# Patient Record
Sex: MALE | Race: WHITE | Employment: UNEMPLOYED | URBAN - METROPOLITAN AREA
[De-identification: names, ages, dates, MRNs, and addresses within clinical notes are randomized per-mention and may not be internally consistent; named-entity substitution may affect disease eponyms.]

---

## 2019-04-25 ENCOUNTER — HOSPITAL ENCOUNTER (EMERGENCY)
Age: 39
Discharge: HOME | End: 2019-04-25
Attending: EMERGENCY MEDICINE
Payer: MEDICAID

## 2019-04-25 VITALS
RESPIRATION RATE: 18 BRPM | OXYGEN SATURATION: 99 % | DIASTOLIC BLOOD PRESSURE: 83 MMHG | HEART RATE: 71 BPM | TEMPERATURE: 98.2 F | SYSTOLIC BLOOD PRESSURE: 123 MMHG

## 2019-04-25 DIAGNOSIS — S00.431A HEMATOMA OF RIGHT PINNA, INITIAL ENCOUNTER: Primary | ICD-10-CM

## 2019-04-25 PROCEDURE — 4500000022 HC ED LEVEL 2 PROCEDURE

## 2019-04-25 PROCEDURE — 2500000003 HC RX 250 WO HCPCS: Performed by: STUDENT IN AN ORGANIZED HEALTH CARE EDUCATION/TRAINING PROGRAM

## 2019-04-25 PROCEDURE — 99282 EMERGENCY DEPT VISIT SF MDM: CPT

## 2019-04-25 RX ORDER — CEPHALEXIN 500 MG/1
500 CAPSULE ORAL 4 TIMES DAILY
Qty: 28 CAPSULE | Refills: 0 | Status: SHIPPED | OUTPATIENT
Start: 2019-04-25 | End: 2019-05-02

## 2019-04-25 RX ORDER — LIDOCAINE HYDROCHLORIDE AND EPINEPHRINE 10; 10 MG/ML; UG/ML
20 INJECTION, SOLUTION INFILTRATION; PERINEURAL ONCE
Status: COMPLETED | OUTPATIENT
Start: 2019-04-25 | End: 2019-04-25

## 2019-04-25 RX ADMIN — LIDOCAINE HYDROCHLORIDE,EPINEPHRINE BITARTRATE 20 ML: 10; .01 INJECTION, SOLUTION INFILTRATION; PERINEURAL at 14:29

## 2019-04-25 ASSESSMENT — PAIN DESCRIPTION - ORIENTATION: ORIENTATION: RIGHT

## 2019-04-25 ASSESSMENT — PAIN DESCRIPTION - LOCATION: LOCATION: EAR

## 2019-04-25 ASSESSMENT — PAIN DESCRIPTION - DESCRIPTORS: DESCRIPTORS: STABBING

## 2019-04-25 ASSESSMENT — PAIN DESCRIPTION - PROGRESSION: CLINICAL_PROGRESSION: GRADUALLY WORSENING

## 2019-04-25 ASSESSMENT — PAIN DESCRIPTION - FREQUENCY: FREQUENCY: CONTINUOUS

## 2019-04-25 ASSESSMENT — PAIN SCALES - GENERAL
PAINLEVEL_OUTOF10: 8
PAINLEVEL_OUTOF10: 8
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
## 2019-04-25 NOTE — ED NOTES
Pt resting in bed with eyes closed. Pt wakes up to verbal stimuli. Call light in reach will continue to monitor.       Shaniqua Lord RN  04/25/19 0188
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
## 2019-05-08 ENCOUNTER — OFFICE VISIT (OUTPATIENT)
Dept: ENT CLINIC | Age: 39
End: 2019-05-08
Payer: MEDICAID

## 2019-05-08 VITALS
DIASTOLIC BLOOD PRESSURE: 87 MMHG | HEART RATE: 84 BPM | SYSTOLIC BLOOD PRESSURE: 138 MMHG | WEIGHT: 165 LBS | BODY MASS INDEX: 23.1 KG/M2 | HEIGHT: 71 IN | TEMPERATURE: 97.7 F

## 2019-05-08 DIAGNOSIS — H93.11 TINNITUS, RIGHT: ICD-10-CM

## 2019-05-08 DIAGNOSIS — S00.431S: Primary | ICD-10-CM

## 2019-05-08 PROCEDURE — 99203 OFFICE O/P NEW LOW 30 MIN: CPT | Performed by: OTOLARYNGOLOGY

## 2019-05-08 PROCEDURE — G8420 CALC BMI NORM PARAMETERS: HCPCS | Performed by: OTOLARYNGOLOGY

## 2019-05-08 PROCEDURE — 4004F PT TOBACCO SCREEN RCVD TLK: CPT | Performed by: OTOLARYNGOLOGY

## 2019-05-08 PROCEDURE — G8427 DOCREV CUR MEDS BY ELIG CLIN: HCPCS | Performed by: OTOLARYNGOLOGY

## 2019-05-08 ASSESSMENT — ENCOUNTER SYMPTOMS
VOMITING: 0
FACIAL SWELLING: 0
BACK PAIN: 0
SINUS PAIN: 0
COLOR CHANGE: 0
SORE THROAT: 0
SINUS PRESSURE: 0
EYE DISCHARGE: 0
COUGH: 0
CHOKING: 0
PHOTOPHOBIA: 0
EYE ITCHING: 0
WHEEZING: 0
RHINORRHEA: 0
BLOOD IN STOOL: 0
STRIDOR: 0
CONSTIPATION: 0
NAUSEA: 0
DIARRHEA: 0
VOICE CHANGE: 0
TROUBLE SWALLOWING: 0
SHORTNESS OF BREATH: 0
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
## 2019-07-30 ENCOUNTER — HOSPITAL ENCOUNTER (EMERGENCY)
Age: 39
Discharge: HOME | End: 2019-07-30
Attending: EMERGENCY MEDICINE
Payer: MEDICAID

## 2019-07-30 ENCOUNTER — APPOINTMENT (OUTPATIENT)
Dept: GENERAL RADIOLOGY | Age: 39
End: 2019-07-30
Payer: MEDICAID

## 2019-07-30 VITALS
HEART RATE: 84 BPM | TEMPERATURE: 98 F | DIASTOLIC BLOOD PRESSURE: 83 MMHG | WEIGHT: 175 LBS | BODY MASS INDEX: 24.5 KG/M2 | SYSTOLIC BLOOD PRESSURE: 113 MMHG | RESPIRATION RATE: 16 BRPM | OXYGEN SATURATION: 94 % | HEIGHT: 71 IN

## 2019-07-30 DIAGNOSIS — S90.31XA CONTUSION OF RIGHT FOOT, INITIAL ENCOUNTER: Primary | ICD-10-CM

## 2019-07-30 PROCEDURE — 73630 X-RAY EXAM OF FOOT: CPT

## 2019-07-30 PROCEDURE — 99283 EMERGENCY DEPT VISIT LOW MDM: CPT

## 2019-07-30 ASSESSMENT — ENCOUNTER SYMPTOMS
SHORTNESS OF BREATH: 0
COUGH: 0
WHEEZING: 0
ABDOMINAL PAIN: 0
NAUSEA: 0
VOMITING: 0
EYE PAIN: 0
DIARRHEA: 0

## 2020-03-05 ENCOUNTER — HOSPITAL ENCOUNTER (INPATIENT)
Age: 40
LOS: 1 days | Discharge: HOME | DRG: 720 | End: 2020-03-07
Attending: EMERGENCY MEDICINE | Admitting: INTERNAL MEDICINE
Payer: COMMERCIAL

## 2020-03-05 PROCEDURE — 85025 COMPLETE CBC W/AUTO DIFF WBC: CPT

## 2020-03-05 PROCEDURE — 96365 THER/PROPH/DIAG IV INF INIT: CPT

## 2020-03-05 PROCEDURE — 36415 COLL VENOUS BLD VENIPUNCTURE: CPT

## 2020-03-05 PROCEDURE — 2580000003 HC RX 258: Performed by: PHYSICIAN ASSISTANT

## 2020-03-05 PROCEDURE — 83605 ASSAY OF LACTIC ACID: CPT

## 2020-03-05 PROCEDURE — 80076 HEPATIC FUNCTION PANEL: CPT

## 2020-03-05 PROCEDURE — 99284 EMERGENCY DEPT VISIT MOD MDM: CPT

## 2020-03-05 PROCEDURE — 96375 TX/PRO/DX INJ NEW DRUG ADDON: CPT

## 2020-03-05 PROCEDURE — 6360000002 HC RX W HCPCS: Performed by: PHYSICIAN ASSISTANT

## 2020-03-05 PROCEDURE — 87040 BLOOD CULTURE FOR BACTERIA: CPT

## 2020-03-05 PROCEDURE — 80048 BASIC METABOLIC PNL TOTAL CA: CPT

## 2020-03-05 PROCEDURE — 86140 C-REACTIVE PROTEIN: CPT

## 2020-03-05 PROCEDURE — 83735 ASSAY OF MAGNESIUM: CPT

## 2020-03-05 RX ORDER — SODIUM CHLORIDE, SODIUM LACTATE, POTASSIUM CHLORIDE, AND CALCIUM CHLORIDE .6; .31; .03; .02 G/100ML; G/100ML; G/100ML; G/100ML
30 INJECTION, SOLUTION INTRAVENOUS ONCE
Status: COMPLETED | OUTPATIENT
Start: 2020-03-05 | End: 2020-03-06

## 2020-03-05 RX ORDER — MORPHINE SULFATE 4 MG/ML
4 INJECTION, SOLUTION INTRAMUSCULAR; INTRAVENOUS
Status: DISCONTINUED | OUTPATIENT
Start: 2020-03-05 | End: 2020-03-06

## 2020-03-05 RX ADMIN — SODIUM CHLORIDE, POTASSIUM CHLORIDE, SODIUM LACTATE AND CALCIUM CHLORIDE 2478 ML: 600; 310; 30; 20 INJECTION, SOLUTION INTRAVENOUS at 23:56

## 2020-03-05 RX ADMIN — MORPHINE SULFATE 4 MG: 4 INJECTION INTRAVENOUS at 23:56

## 2020-03-05 ASSESSMENT — PAIN SCALES - GENERAL
PAINLEVEL_OUTOF10: 10
PAINLEVEL_OUTOF10: 10

## 2020-03-06 ENCOUNTER — APPOINTMENT (OUTPATIENT)
Dept: CT IMAGING | Age: 40
DRG: 720 | End: 2020-03-06
Payer: COMMERCIAL

## 2020-03-06 PROBLEM — A41.9 SEPSIS (HCC): Status: ACTIVE | Noted: 2020-03-06

## 2020-03-06 LAB
ABO/RH: NORMAL
ALBUMIN SERPL-MCNC: 3.4 G/DL (ref 3.4–5)
ALBUMIN SERPL-MCNC: 3.7 G/DL (ref 3.4–5)
ALP BLD-CCNC: 104 U/L (ref 40–129)
ALT SERPL-CCNC: 72 U/L (ref 10–40)
AMPHETAMINE SCREEN, URINE: ABNORMAL
ANION GAP SERPL CALCULATED.3IONS-SCNC: 11 MMOL/L (ref 3–16)
ANION GAP SERPL CALCULATED.3IONS-SCNC: 12 MMOL/L (ref 3–16)
ANTIBODY IDENTIFICATION: NORMAL
ANTIBODY SCREEN: NORMAL
AST SERPL-CCNC: 21 U/L (ref 15–37)
BANDED NEUTROPHILS RELATIVE PERCENT: 1 % (ref 0–7)
BARBITURATE SCREEN URINE: ABNORMAL
BASOPHILS ABSOLUTE: 0 K/UL (ref 0–0.2)
BASOPHILS ABSOLUTE: 0.2 K/UL (ref 0–0.2)
BASOPHILS RELATIVE PERCENT: 0 %
BASOPHILS RELATIVE PERCENT: 1.2 %
BENZODIAZEPINE SCREEN, URINE: ABNORMAL
BILIRUB SERPL-MCNC: 0.7 MG/DL (ref 0–1)
BILIRUBIN DIRECT: <0.2 MG/DL (ref 0–0.3)
BILIRUBIN, INDIRECT: ABNORMAL MG/DL (ref 0–1)
BUN BLDV-MCNC: 6 MG/DL (ref 7–20)
BUN BLDV-MCNC: 7 MG/DL (ref 7–20)
C-REACTIVE PROTEIN: 25.3 MG/L (ref 0–5.1)
CALCIUM SERPL-MCNC: 8.2 MG/DL (ref 8.3–10.6)
CALCIUM SERPL-MCNC: 9.1 MG/DL (ref 8.3–10.6)
CANNABINOID SCREEN URINE: ABNORMAL
CHLORIDE BLD-SCNC: 89 MMOL/L (ref 99–110)
CHLORIDE BLD-SCNC: 92 MMOL/L (ref 99–110)
CO2: 26 MMOL/L (ref 21–32)
CO2: 27 MMOL/L (ref 21–32)
COCAINE METABOLITE SCREEN URINE: POSITIVE
CREAT SERPL-MCNC: 0.6 MG/DL (ref 0.9–1.3)
CREAT SERPL-MCNC: <0.5 MG/DL (ref 0.9–1.3)
EOSINOPHILS ABSOLUTE: 0 K/UL (ref 0–0.6)
EOSINOPHILS ABSOLUTE: 0 K/UL (ref 0–0.6)
EOSINOPHILS RELATIVE PERCENT: 0 %
EOSINOPHILS RELATIVE PERCENT: 0.3 %
GFR AFRICAN AMERICAN: >60
GFR AFRICAN AMERICAN: >60
GFR NON-AFRICAN AMERICAN: >60
GFR NON-AFRICAN AMERICAN: >60
GLUCOSE BLD-MCNC: 198 MG/DL (ref 70–99)
GLUCOSE BLD-MCNC: 238 MG/DL (ref 70–99)
GLUCOSE BLD-MCNC: 282 MG/DL (ref 70–99)
GLUCOSE BLD-MCNC: 308 MG/DL (ref 70–99)
GLUCOSE BLD-MCNC: 326 MG/DL (ref 70–99)
GLUCOSE BLD-MCNC: 445 MG/DL (ref 70–99)
HCT VFR BLD CALC: 40.5 % (ref 40.5–52.5)
HCT VFR BLD CALC: 41.5 % (ref 40.5–52.5)
HEMOGLOBIN: 14.3 G/DL (ref 13.5–17.5)
HEMOGLOBIN: 14.6 G/DL (ref 13.5–17.5)
INR BLD: 1.28 (ref 0.86–1.14)
LACTIC ACID: 1.2 MMOL/L (ref 0.4–2)
LACTIC ACID: 1.2 MMOL/L (ref 0.4–2)
LACTIC ACID: 2.8 MMOL/L (ref 0.4–2)
LYMPHOCYTES ABSOLUTE: 1.7 K/UL (ref 1–5.1)
LYMPHOCYTES ABSOLUTE: 2.1 K/UL (ref 1–5.1)
LYMPHOCYTES RELATIVE PERCENT: 12 %
LYMPHOCYTES RELATIVE PERCENT: 15.1 %
Lab: ABNORMAL
MAGNESIUM: 1.4 MG/DL (ref 1.8–2.4)
MAGNESIUM: 1.6 MG/DL (ref 1.8–2.4)
MCH RBC QN AUTO: 31.9 PG (ref 26–34)
MCH RBC QN AUTO: 31.9 PG (ref 26–34)
MCHC RBC AUTO-ENTMCNC: 35.1 G/DL (ref 31–36)
MCHC RBC AUTO-ENTMCNC: 35.3 G/DL (ref 31–36)
MCV RBC AUTO: 90.5 FL (ref 80–100)
MCV RBC AUTO: 91 FL (ref 80–100)
METAMYELOCYTES RELATIVE PERCENT: 2 %
METHADONE SCREEN, URINE: ABNORMAL
MONOCYTES ABSOLUTE: 0.7 K/UL (ref 0–1.3)
MONOCYTES ABSOLUTE: 1.2 K/UL (ref 0–1.3)
MONOCYTES RELATIVE PERCENT: 5 %
MONOCYTES RELATIVE PERCENT: 8.7 %
NEUTROPHILS ABSOLUTE: 10.2 K/UL (ref 1.7–7.7)
NEUTROPHILS ABSOLUTE: 11.5 K/UL (ref 1.7–7.7)
NEUTROPHILS RELATIVE PERCENT: 74.7 %
NEUTROPHILS RELATIVE PERCENT: 80 %
OPIATE SCREEN URINE: POSITIVE
OXYCODONE URINE: ABNORMAL
PDW BLD-RTO: 13 % (ref 12.4–15.4)
PDW BLD-RTO: 13.2 % (ref 12.4–15.4)
PERFORMED ON: ABNORMAL
PH UA: 6
PHENCYCLIDINE SCREEN URINE: ABNORMAL
PHOSPHORUS: 3.3 MG/DL (ref 2.5–4.9)
PLATELET # BLD: 181 K/UL (ref 135–450)
PLATELET # BLD: 207 K/UL (ref 135–450)
PMV BLD AUTO: 8.3 FL (ref 5–10.5)
PMV BLD AUTO: 8.7 FL (ref 5–10.5)
POTASSIUM REFLEX MAGNESIUM: 3.5 MMOL/L (ref 3.5–5.1)
POTASSIUM SERPL-SCNC: 3.5 MMOL/L (ref 3.5–5.1)
PROPOXYPHENE SCREEN: ABNORMAL
PROTHROMBIN TIME: 14.9 SEC (ref 10–13.2)
RBC # BLD: 4.47 M/UL (ref 4.2–5.9)
RBC # BLD: 4.56 M/UL (ref 4.2–5.9)
SODIUM BLD-SCNC: 127 MMOL/L (ref 136–145)
SODIUM BLD-SCNC: 130 MMOL/L (ref 136–145)
TOTAL PROTEIN: 7.5 G/DL (ref 6.4–8.2)
WBC # BLD: 13.6 K/UL (ref 4–11)
WBC # BLD: 13.9 K/UL (ref 4–11)

## 2020-03-06 PROCEDURE — 86922 COMPATIBILITY TEST ANTIGLOB: CPT

## 2020-03-06 PROCEDURE — 6360000002 HC RX W HCPCS: Performed by: INTERNAL MEDICINE

## 2020-03-06 PROCEDURE — 87040 BLOOD CULTURE FOR BACTERIA: CPT

## 2020-03-06 PROCEDURE — 6370000000 HC RX 637 (ALT 250 FOR IP): Performed by: INTERNAL MEDICINE

## 2020-03-06 PROCEDURE — 6370000000 HC RX 637 (ALT 250 FOR IP): Performed by: PHYSICIAN ASSISTANT

## 2020-03-06 PROCEDURE — 87075 CULTR BACTERIA EXCEPT BLOOD: CPT

## 2020-03-06 PROCEDURE — 85025 COMPLETE CBC W/AUTO DIFF WBC: CPT

## 2020-03-06 PROCEDURE — 2500000003 HC RX 250 WO HCPCS: Performed by: INTERNAL MEDICINE

## 2020-03-06 PROCEDURE — 2580000003 HC RX 258: Performed by: PHYSICIAN ASSISTANT

## 2020-03-06 PROCEDURE — 0JBH0ZZ EXCISION OF LEFT LOWER ARM SUBCUTANEOUS TISSUE AND FASCIA, OPEN APPROACH: ICD-10-PCS | Performed by: ORTHOPAEDIC SURGERY

## 2020-03-06 PROCEDURE — 86900 BLOOD TYPING SEROLOGIC ABO: CPT

## 2020-03-06 PROCEDURE — 96372 THER/PROPH/DIAG INJ SC/IM: CPT

## 2020-03-06 PROCEDURE — 6360000002 HC RX W HCPCS: Performed by: SURGERY

## 2020-03-06 PROCEDURE — 83605 ASSAY OF LACTIC ACID: CPT

## 2020-03-06 PROCEDURE — 83735 ASSAY OF MAGNESIUM: CPT

## 2020-03-06 PROCEDURE — 99219 PR INITIAL OBSERVATION CARE/DAY 50 MINUTES: CPT | Performed by: SURGERY

## 2020-03-06 PROCEDURE — 86870 RBC ANTIBODY IDENTIFICATION: CPT

## 2020-03-06 PROCEDURE — 6360000004 HC RX CONTRAST MEDICATION: Performed by: EMERGENCY MEDICINE

## 2020-03-06 PROCEDURE — 96367 TX/PROPH/DG ADDL SEQ IV INF: CPT

## 2020-03-06 PROCEDURE — 2500000003 HC RX 250 WO HCPCS: Performed by: ORTHOPAEDIC SURGERY

## 2020-03-06 PROCEDURE — 96366 THER/PROPH/DIAG IV INF ADDON: CPT

## 2020-03-06 PROCEDURE — 2500000003 HC RX 250 WO HCPCS: Performed by: PHYSICIAN ASSISTANT

## 2020-03-06 PROCEDURE — 6360000002 HC RX W HCPCS: Performed by: PHYSICIAN ASSISTANT

## 2020-03-06 PROCEDURE — 87205 SMEAR GRAM STAIN: CPT

## 2020-03-06 PROCEDURE — 83036 HEMOGLOBIN GLYCOSYLATED A1C: CPT

## 2020-03-06 PROCEDURE — 87070 CULTURE OTHR SPECIMN AEROBIC: CPT

## 2020-03-06 PROCEDURE — 86850 RBC ANTIBODY SCREEN: CPT

## 2020-03-06 PROCEDURE — 87077 CULTURE AEROBIC IDENTIFY: CPT

## 2020-03-06 PROCEDURE — 73201 CT UPPER EXTREMITY W/DYE: CPT

## 2020-03-06 PROCEDURE — 85610 PROTHROMBIN TIME: CPT

## 2020-03-06 PROCEDURE — 96368 THER/DIAG CONCURRENT INF: CPT

## 2020-03-06 PROCEDURE — 86901 BLOOD TYPING SEROLOGIC RH(D): CPT

## 2020-03-06 PROCEDURE — G0378 HOSPITAL OBSERVATION PER HR: HCPCS

## 2020-03-06 PROCEDURE — 1200000000 HC SEMI PRIVATE

## 2020-03-06 PROCEDURE — 36415 COLL VENOUS BLD VENIPUNCTURE: CPT

## 2020-03-06 PROCEDURE — 2580000003 HC RX 258: Performed by: INTERNAL MEDICINE

## 2020-03-06 PROCEDURE — 80307 DRUG TEST PRSMV CHEM ANLYZR: CPT

## 2020-03-06 PROCEDURE — 80069 RENAL FUNCTION PANEL: CPT

## 2020-03-06 RX ORDER — ACETAMINOPHEN 325 MG/1
650 TABLET ORAL ONCE
Status: COMPLETED | OUTPATIENT
Start: 2020-03-06 | End: 2020-03-06

## 2020-03-06 RX ORDER — PROMETHAZINE HYDROCHLORIDE 25 MG/1
12.5 TABLET ORAL EVERY 6 HOURS PRN
Status: DISCONTINUED | OUTPATIENT
Start: 2020-03-06 | End: 2020-03-07 | Stop reason: HOSPADM

## 2020-03-06 RX ORDER — MAGNESIUM SULFATE IN WATER 40 MG/ML
4 INJECTION, SOLUTION INTRAVENOUS ONCE
Status: DISCONTINUED | OUTPATIENT
Start: 2020-03-06 | End: 2020-03-06

## 2020-03-06 RX ORDER — NICOTINE POLACRILEX 4 MG
15 LOZENGE BUCCAL PRN
Status: DISCONTINUED | OUTPATIENT
Start: 2020-03-06 | End: 2020-03-07 | Stop reason: HOSPADM

## 2020-03-06 RX ORDER — ONDANSETRON 2 MG/ML
4 INJECTION INTRAMUSCULAR; INTRAVENOUS EVERY 6 HOURS PRN
Status: DISCONTINUED | OUTPATIENT
Start: 2020-03-06 | End: 2020-03-07 | Stop reason: HOSPADM

## 2020-03-06 RX ORDER — INSULIN LISPRO 100 [IU]/ML
0-12 INJECTION, SOLUTION INTRAVENOUS; SUBCUTANEOUS
Status: DISCONTINUED | OUTPATIENT
Start: 2020-03-06 | End: 2020-03-07

## 2020-03-06 RX ORDER — SODIUM CHLORIDE 9 MG/ML
INJECTION, SOLUTION INTRAVENOUS CONTINUOUS
Status: ACTIVE | OUTPATIENT
Start: 2020-03-06 | End: 2020-03-07

## 2020-03-06 RX ORDER — POTASSIUM CHLORIDE 20 MEQ/1
40 TABLET, EXTENDED RELEASE ORAL PRN
Status: DISCONTINUED | OUTPATIENT
Start: 2020-03-06 | End: 2020-03-07 | Stop reason: HOSPADM

## 2020-03-06 RX ORDER — INSULIN LISPRO 100 [IU]/ML
0-6 INJECTION, SOLUTION INTRAVENOUS; SUBCUTANEOUS NIGHTLY
Status: DISCONTINUED | OUTPATIENT
Start: 2020-03-06 | End: 2020-03-07

## 2020-03-06 RX ORDER — MAGNESIUM SULFATE IN WATER 40 MG/ML
2 INJECTION, SOLUTION INTRAVENOUS PRN
Status: DISCONTINUED | OUTPATIENT
Start: 2020-03-06 | End: 2020-03-07 | Stop reason: HOSPADM

## 2020-03-06 RX ORDER — CLINDAMYCIN PHOSPHATE 600 MG/50ML
600 INJECTION INTRAVENOUS EVERY 8 HOURS
Status: DISCONTINUED | OUTPATIENT
Start: 2020-03-06 | End: 2020-03-07 | Stop reason: HOSPADM

## 2020-03-06 RX ORDER — DEXTROSE MONOHYDRATE 50 MG/ML
100 INJECTION, SOLUTION INTRAVENOUS PRN
Status: DISCONTINUED | OUTPATIENT
Start: 2020-03-06 | End: 2020-03-07 | Stop reason: HOSPADM

## 2020-03-06 RX ORDER — SODIUM CHLORIDE 0.9 % (FLUSH) 0.9 %
10 SYRINGE (ML) INJECTION PRN
Status: DISCONTINUED | OUTPATIENT
Start: 2020-03-06 | End: 2020-03-07 | Stop reason: HOSPADM

## 2020-03-06 RX ORDER — INSULIN LISPRO 100 [IU]/ML
5 INJECTION, SOLUTION INTRAVENOUS; SUBCUTANEOUS
Status: DISCONTINUED | OUTPATIENT
Start: 2020-03-06 | End: 2020-03-07 | Stop reason: HOSPADM

## 2020-03-06 RX ORDER — SODIUM CHLORIDE 0.9 % (FLUSH) 0.9 %
10 SYRINGE (ML) INJECTION EVERY 12 HOURS SCHEDULED
Status: DISCONTINUED | OUTPATIENT
Start: 2020-03-06 | End: 2020-03-07 | Stop reason: HOSPADM

## 2020-03-06 RX ORDER — LIDOCAINE HYDROCHLORIDE AND EPINEPHRINE 10; 10 MG/ML; UG/ML
20 INJECTION, SOLUTION INFILTRATION; PERINEURAL ONCE
Status: DISCONTINUED | OUTPATIENT
Start: 2020-03-06 | End: 2020-03-06

## 2020-03-06 RX ORDER — ACETAMINOPHEN 325 MG/1
650 TABLET ORAL EVERY 6 HOURS PRN
Status: DISCONTINUED | OUTPATIENT
Start: 2020-03-06 | End: 2020-03-07 | Stop reason: HOSPADM

## 2020-03-06 RX ORDER — POLYETHYLENE GLYCOL 3350 17 G/17G
17 POWDER, FOR SOLUTION ORAL DAILY PRN
Status: DISCONTINUED | OUTPATIENT
Start: 2020-03-06 | End: 2020-03-07 | Stop reason: HOSPADM

## 2020-03-06 RX ORDER — POTASSIUM CHLORIDE 7.45 MG/ML
10 INJECTION INTRAVENOUS PRN
Status: DISCONTINUED | OUTPATIENT
Start: 2020-03-06 | End: 2020-03-07 | Stop reason: HOSPADM

## 2020-03-06 RX ORDER — DEXTROSE MONOHYDRATE 25 G/50ML
12.5 INJECTION, SOLUTION INTRAVENOUS PRN
Status: DISCONTINUED | OUTPATIENT
Start: 2020-03-06 | End: 2020-03-07 | Stop reason: HOSPADM

## 2020-03-06 RX ORDER — KETOROLAC TROMETHAMINE 30 MG/ML
30 INJECTION, SOLUTION INTRAMUSCULAR; INTRAVENOUS EVERY 6 HOURS PRN
Status: DISCONTINUED | OUTPATIENT
Start: 2020-03-06 | End: 2020-03-07 | Stop reason: HOSPADM

## 2020-03-06 RX ORDER — ACETAMINOPHEN 650 MG/1
650 SUPPOSITORY RECTAL EVERY 6 HOURS PRN
Status: DISCONTINUED | OUTPATIENT
Start: 2020-03-06 | End: 2020-03-07 | Stop reason: HOSPADM

## 2020-03-06 RX ORDER — MORPHINE SULFATE 4 MG/ML
4 INJECTION, SOLUTION INTRAMUSCULAR; INTRAVENOUS EVERY 4 HOURS PRN
Status: DISCONTINUED | OUTPATIENT
Start: 2020-03-06 | End: 2020-03-07 | Stop reason: HOSPADM

## 2020-03-06 RX ORDER — POTASSIUM CHLORIDE 7.45 MG/ML
10 INJECTION INTRAVENOUS
Status: COMPLETED | OUTPATIENT
Start: 2020-03-06 | End: 2020-03-06

## 2020-03-06 RX ORDER — OXYCODONE HYDROCHLORIDE AND ACETAMINOPHEN 5; 325 MG/1; MG/1
1 TABLET ORAL EVERY 6 HOURS PRN
Status: DISCONTINUED | OUTPATIENT
Start: 2020-03-06 | End: 2020-03-07 | Stop reason: HOSPADM

## 2020-03-06 RX ADMIN — KETOROLAC TROMETHAMINE 30 MG: 30 INJECTION, SOLUTION INTRAMUSCULAR at 06:34

## 2020-03-06 RX ADMIN — INSULIN LISPRO 4 UNITS: 100 INJECTION, SOLUTION INTRAVENOUS; SUBCUTANEOUS at 22:16

## 2020-03-06 RX ADMIN — POTASSIUM CHLORIDE 10 MEQ: 10 INJECTION, SOLUTION INTRAVENOUS at 13:53

## 2020-03-06 RX ADMIN — ACETAMINOPHEN 650 MG: 325 TABLET ORAL at 02:13

## 2020-03-06 RX ADMIN — INSULIN LISPRO 2 UNITS: 100 INJECTION, SOLUTION INTRAVENOUS; SUBCUTANEOUS at 18:44

## 2020-03-06 RX ADMIN — VANCOMYCIN HYDROCHLORIDE 1250 MG: 10 INJECTION, POWDER, LYOPHILIZED, FOR SOLUTION INTRAVENOUS at 18:37

## 2020-03-06 RX ADMIN — POTASSIUM CHLORIDE 10 MEQ: 10 INJECTION, SOLUTION INTRAVENOUS at 08:14

## 2020-03-06 RX ADMIN — INSULIN LISPRO 5 UNITS: 100 INJECTION, SOLUTION INTRAVENOUS; SUBCUTANEOUS at 14:50

## 2020-03-06 RX ADMIN — CEFEPIME HYDROCHLORIDE 1 G: 1 INJECTION, POWDER, FOR SOLUTION INTRAMUSCULAR; INTRAVENOUS at 00:08

## 2020-03-06 RX ADMIN — VANCOMYCIN HYDROCHLORIDE 1250 MG: 10 INJECTION, POWDER, LYOPHILIZED, FOR SOLUTION INTRAVENOUS at 09:20

## 2020-03-06 RX ADMIN — CLINDAMYCIN PHOSPHATE 600 MG: 600 INJECTION, SOLUTION INTRAVENOUS at 11:06

## 2020-03-06 RX ADMIN — INSULIN GLARGINE 20 UNITS: 100 INJECTION, SOLUTION SUBCUTANEOUS at 22:15

## 2020-03-06 RX ADMIN — LIDOCAINE HYDROCHLORIDE 5 ML: 10 INJECTION, SOLUTION EPIDURAL; INFILTRATION; INTRACAUDAL; PERINEURAL at 18:44

## 2020-03-06 RX ADMIN — CLINDAMYCIN PHOSPHATE 600 MG: 600 INJECTION, SOLUTION INTRAVENOUS at 03:41

## 2020-03-06 RX ADMIN — IOPAMIDOL 80 ML: 755 INJECTION, SOLUTION INTRAVENOUS at 02:00

## 2020-03-06 RX ADMIN — POTASSIUM CHLORIDE 10 MEQ: 10 INJECTION, SOLUTION INTRAVENOUS at 11:00

## 2020-03-06 RX ADMIN — INSULIN LISPRO 5 UNITS: 100 INJECTION, SOLUTION INTRAVENOUS; SUBCUTANEOUS at 18:44

## 2020-03-06 RX ADMIN — POTASSIUM CHLORIDE 10 MEQ: 10 INJECTION, SOLUTION INTRAVENOUS at 15:47

## 2020-03-06 RX ADMIN — POTASSIUM CHLORIDE 10 MEQ: 10 INJECTION, SOLUTION INTRAVENOUS at 18:46

## 2020-03-06 RX ADMIN — Medication 10 ML: at 09:19

## 2020-03-06 RX ADMIN — INSULIN LISPRO 4 UNITS: 100 INJECTION, SOLUTION INTRAVENOUS; SUBCUTANEOUS at 14:50

## 2020-03-06 RX ADMIN — VANCOMYCIN HYDROCHLORIDE 1250 MG: 10 INJECTION, POWDER, LYOPHILIZED, FOR SOLUTION INTRAVENOUS at 01:25

## 2020-03-06 RX ADMIN — CLINDAMYCIN PHOSPHATE 600 MG: 600 INJECTION, SOLUTION INTRAVENOUS at 18:37

## 2020-03-06 RX ADMIN — CEFEPIME 2 G: 2 INJECTION, POWDER, FOR SOLUTION INTRAMUSCULAR; INTRAVENOUS at 13:53

## 2020-03-06 RX ADMIN — MAGNESIUM SULFATE HEPTAHYDRATE 2 G: 40 INJECTION, SOLUTION INTRAVENOUS at 06:33

## 2020-03-06 RX ADMIN — SODIUM CHLORIDE: 9 INJECTION, SOLUTION INTRAVENOUS at 06:12

## 2020-03-06 RX ADMIN — ENOXAPARIN SODIUM 40 MG: 40 INJECTION SUBCUTANEOUS at 08:13

## 2020-03-06 ASSESSMENT — PAIN SCALES - GENERAL
PAINLEVEL_OUTOF10: 8
PAINLEVEL_OUTOF10: 10
PAINLEVEL_OUTOF10: 0

## 2020-03-06 NOTE — CONSULTS
Medications on File Prior to Encounter   Medication Sig Dispense Refill    aspirin 81 MG tablet Take 81 mg by mouth daily       Current Meds  clindamycin (CLEOCIN) 600 mg in dextrose 5 % 50 mL IVPB, Q8H  morphine injection 4 mg, Q1H PRN      Family History:   History reviewed. No pertinent family history. Social History:   TOBACCO:   reports that he has quit smoking. He has never used smokeless tobacco.  ETOH:   reports previous alcohol use. DRUGS:   reports previous drug use. Review of Systems:   A 14 point review of systems was conducted, significant findings as noted in HPI. All other systems negative.     Physical Exam:    Vitals:    03/05/20 2312 03/06/20 0116   BP: (!) 152/105 124/84   Pulse: 114 102   Resp: 18 16   Temp: 100.7 °F (38.2 °C)    TempSrc: Oral    SpO2: 99% 92%   Weight: 182 lb (82.6 kg)    Height: 5' 11\" (1.803 m)      General Appearance: Alert, unkempt clothing and personal hygiene, appears uncomfortable but no acute distress      HEENT: Normocephalic/atraumatic; no scleral icterus; trachea midline; trachea midline  Chest/Lungs: Normal effort; breathing room air; equal chest rise; no adventitious sounds   Cardiovascular: Tachycardic, normotensive   Abdomen: Non-distended, non-tender, soft  Skin: Warm and dry, multiple sites of track marks bilateral upper extremities both well-healed and also with scabs present  Extremities: Left upper extremity with large tender area (~10cm x 7cm) with blanching erythema with area of greatest erythema approximating the antecubital fossa; erythematous area of left upper extremity is edematous and tense compared to the contralateral extremity; left upper extremity motor sensation is intact, though range of motion is limited at the elbow secondary to pain; right radial, ulnar, and brachial pulses are hyperdynamic; erythematous area has a warmer surface temperature vs contralateral limb; there is no crepitus   Neuro/Psych: A&Ox3; no focal deficits; guarded pain and swelling has progressed relatively quickly into a large area of blanching erythema and swelling most consistent with erysipelas vs cellulitis as physical exam and CT performed in the ED do not suggest abscess or necrotizing fasciitis at this time. Nevertheless, the patient's fever and tachycardia along with hyponatremia, hyperglycemia, and leukocytosis remain concerning for a necrotizing soft tissue infection. Initial elevated lactate resolved with IV hydration in the ED. Sepsis secondary to skin and soft tissue infection of left upper extremity      - No acute surgical intervention at this point in time, though the threshold for proceeding to surgical exploration is very low        - Keep patient NPO with IVF     - Will follow patient closely      - Continue broad spectrum ABx with MRSA and toxin-mediated infection coverage started in the ED        - Cefepime, Vanc, and Clinda      - Area of concern demarcated with skin marker at time of exam to monitor progression/resolution with IV ABx         - If progression is noted on interval exam despite IV ABx, patient will require surgical exploration and debridement          - If stable or resolving, will continue to closely monitor with serial exams and trending labs     Patient was seen, examined, and discussed with Senior Surgery Resident; discussed with Staff    Faisal Swain MD, MPH  PGY-1 General Surgery  03/06/20  3:08 AM  [phone]    I saw and independently examined the patient today. I agree with the history of present illness, past medical/surgical histories, family history, social history, medication list and allergies as listed. The review of systems is as noted above. My physical exam confirms the findings listed above. Review of labs, pathology reports, radiology reports and medical records confirm the findings noted above. I edited the note where appropriate. Will involve orthopedics before going for drainage.     Harry Lora MD  Surgery

## 2020-03-06 NOTE — ED PROVIDER NOTES
ED Attending Attestation Note     Date of evaluation: 3/5/2020    This patient was seen by the advance practice provider. I have seen and examined the patient, agree with the workup, evaluation, management and diagnosis. The care plan has been discussed. My assessment reveals a 6year-old  male who has the stigmata of IV drug use who presents with a large area of abscess and cellulitis in the left antecubital fossa. I cannot palpate any crepitance on examination although he clearly displays signs of severe sepsis. Broad-spectrum antibiotics initiated. Will obtain CT scan to ensure there is no evidence of subcutaneous gas to indicate a necrotizing infection at this point. Critical Care:  Due to the immediate potential for life-threatening deterioration due to severe sepsis, I spent 37 minutes providing critical care. This time excludes time spent performing procedures but includes time spent on direct patient care, history retrieval, review of the chart, and discussions with patient, family, and consultant(s).      Jose Maria Shepard MD  03/05/20 1108

## 2020-03-06 NOTE — CONSULTS
K 3.5 3.5   CL 89* 92*   CO2 26 27   BUN 7 6*   CREATININE 0.6* <0.5*   GLUCOSE 445* 282*     INR:   Recent Labs     03/06/20  0534   INR 1.28*       Radiology:   CT ELBOW LEFT W CONTRAST   Final Result   findings suspicious for cellulitis extending from the distal third of the    humerus down to the level of the wrist along the volar surface of the    left upper extremity without evidence of well-defined abscess collection    on this study. No evidence for gas in the soft tissues to suggest    necrotizing fasciitis               CT RADIUS ULNA LEFT W CONTRAST   Final Result     findings suspicious for cellulitis without evidence of well-defined    abscess collection on this study. No evidence for gas in the soft    tissues to suggest necrotizing fasciitis          CT HUMERUS LEFT W CONTRAST   Final Result   1. Extensive subcutaneous fat stranding along the upper arm extending through the elbow and into the forearm. The epicenter of the abnormality appears to be in the upper forearm and elbow region. There is superficial fasciitis and suspected mild deep    fasciitis and possible myositis in the anterior muscular compartment of the upper arm. No discrete abscess identified. IMPRESSION/RECOMMENDATIONS:    Assessment: LEFT elbow antecubital fossa abscess, likely IVDA although denies    Plan:  1) Recommend I+D through skin and open subQ and flexor/pronator fascia. Will express copious purulence likely. Very unlikely involvement elbow articular joint based upon mechanism and exam.  I am available all weekend if further f/u required    Case discussed with surgical resident on call         Thank you for the opportunity to consult on this patient.     Juana Coto

## 2020-03-06 NOTE — CARE COORDINATION
representative Rachelle Powell and his family were provided with a choice of provider and agrees with the discharge plan Yes    Freedom of choice list was provided with basic dialogue that supports the patient's individualized plan of care/goals and shares the quality data associated with the providers.  Yes      Care Transition patient: No    Ashley Iglesias RN  [facility] [employer]  Case Management Department  Ph: [phone]   Fax:

## 2020-03-06 NOTE — PROGRESS NOTES
Clinical Pharmacy Progress Note    Admit date: 3/5/2020     Subjective/Objective:  Pt is a 37yom with PMHx that includes chronic Hep C with cirrhosis, poorly controlled DM 2, and IVDU who is admitted with LUE cellulitis and possible left antecubital fossa abscess. Interval update:  General surgery evaluated overnight - no acute need for surgical intervention, but monitoring closely. Ortho also now consulted. Pharmacy is consulted to dose Vancomycin per Dr. Mame Marsh    Current antibiotics:  Cefepime 2g IV q12h - day #1  Clindamycin 600mg IV q8h - day #1  Vancomycin - Pharmacy to dose - day #1   1.25g IV q8h (3/6 - current)      Recent Labs     03/05/20  2346 03/06/20  0534   * 130*   K 3.5 3.5   CL 89* 92*   CO2 26 27   BUN 7 6*   CREATININE 0.6* <0.5*   GLUCOSE 445* 282*       CrCl cannot be calculated (This lab value cannot be used to calculate CrCl because it is not a number: <0.5). Lab Results   Component Value Date    WBC 13.6 (H) 03/06/2020    HGB 14.3 03/06/2020    HCT 40.5 03/06/2020    MCV 90.5 03/06/2020     03/06/2020       Lab Results   Component Value Date    PROTIME 14.9 (H) 03/06/2020    INR 1.28 (H) 03/06/2020       Height:  5' 11\" (180.3 cm)  Weight:  182 lb (82.6 kg)    Culture results:  Blood (3/5) = pending    Prophylaxis:  VTE:  Enoxaparin  GI:  Not indicated    Vaccination screening:  Pneumonia:  Up to date  Influenza:  Ordered to be given 3/7 per policy    Assessment/Plan:  1)  LUE cellulitis with possible antecubital fossa abscess:  Cefepime + Clindamycin + Vancomycin - day #1  · Vancomycin - Pharmacy to dose  · Continue 1.25g IV q8h. · Trough has been ordered with dose due 3/7 09:00. Desired trough ~15 until abscess ruled out. · Clinical condition will be monitored closely, and levels will be ordered & dose adjustments made as appropriate.     Please call with questions--  Thanks--  Alexis Javier, PharmD, [address] [facility], [address] [facility] [phone] ([employer])  3/6/2020 10:03

## 2020-03-07 VITALS
BODY MASS INDEX: 25.48 KG/M2 | TEMPERATURE: 98 F | OXYGEN SATURATION: 97 % | SYSTOLIC BLOOD PRESSURE: 129 MMHG | DIASTOLIC BLOOD PRESSURE: 82 MMHG | HEART RATE: 80 BPM | HEIGHT: 71 IN | RESPIRATION RATE: 18 BRPM | WEIGHT: 182 LBS

## 2020-03-07 LAB
ALBUMIN SERPL-MCNC: 3.1 G/DL (ref 3.4–5)
ALBUMIN SERPL-MCNC: 3.4 G/DL (ref 3.4–5)
ALP BLD-CCNC: 92 U/L (ref 40–129)
ALT SERPL-CCNC: 42 U/L (ref 10–40)
ANION GAP SERPL CALCULATED.3IONS-SCNC: 12 MMOL/L (ref 3–16)
AST SERPL-CCNC: 19 U/L (ref 15–37)
BASOPHILS ABSOLUTE: 0 K/UL (ref 0–0.2)
BASOPHILS RELATIVE PERCENT: 0.3 %
BILIRUB SERPL-MCNC: 0.5 MG/DL (ref 0–1)
BILIRUBIN DIRECT: <0.2 MG/DL (ref 0–0.3)
BILIRUBIN, INDIRECT: ABNORMAL MG/DL (ref 0–1)
BUN BLDV-MCNC: 8 MG/DL (ref 7–20)
CALCIUM SERPL-MCNC: 8.2 MG/DL (ref 8.3–10.6)
CHLORIDE BLD-SCNC: 100 MMOL/L (ref 99–110)
CO2: 23 MMOL/L (ref 21–32)
CREAT SERPL-MCNC: 0.6 MG/DL (ref 0.9–1.3)
EOSINOPHILS ABSOLUTE: 0.1 K/UL (ref 0–0.6)
EOSINOPHILS RELATIVE PERCENT: 0.7 %
ESTIMATED AVERAGE GLUCOSE: 274.7 MG/DL
GFR AFRICAN AMERICAN: >60
GFR NON-AFRICAN AMERICAN: >60
GLUCOSE BLD-MCNC: 220 MG/DL (ref 70–99)
GLUCOSE BLD-MCNC: 272 MG/DL (ref 70–99)
GLUCOSE BLD-MCNC: 307 MG/DL (ref 70–99)
HBA1C MFR BLD: 11.2 %
HCT VFR BLD CALC: 41.3 % (ref 40.5–52.5)
HEMOGLOBIN: 14.7 G/DL (ref 13.5–17.5)
LYMPHOCYTES ABSOLUTE: 1.9 K/UL (ref 1–5.1)
LYMPHOCYTES RELATIVE PERCENT: 19.3 %
MAGNESIUM: 2 MG/DL (ref 1.8–2.4)
MCH RBC QN AUTO: 32.1 PG (ref 26–34)
MCHC RBC AUTO-ENTMCNC: 35.5 G/DL (ref 31–36)
MCV RBC AUTO: 90.5 FL (ref 80–100)
MONOCYTES ABSOLUTE: 0.7 K/UL (ref 0–1.3)
MONOCYTES RELATIVE PERCENT: 7.6 %
NEUTROPHILS ABSOLUTE: 7 K/UL (ref 1.7–7.7)
NEUTROPHILS RELATIVE PERCENT: 72.1 %
PDW BLD-RTO: 12.9 % (ref 12.4–15.4)
PERFORMED ON: ABNORMAL
PERFORMED ON: ABNORMAL
PHOSPHORUS: 3 MG/DL (ref 2.5–4.9)
PLATELET # BLD: 179 K/UL (ref 135–450)
PMV BLD AUTO: 8.7 FL (ref 5–10.5)
POTASSIUM SERPL-SCNC: 3.8 MMOL/L (ref 3.5–5.1)
RBC # BLD: 4.57 M/UL (ref 4.2–5.9)
SODIUM BLD-SCNC: 135 MMOL/L (ref 136–145)
TOTAL PROTEIN: 6.9 G/DL (ref 6.4–8.2)
VANCOMYCIN TROUGH: 5.8 UG/ML (ref 10–20)
WBC # BLD: 9.7 K/UL (ref 4–11)

## 2020-03-07 PROCEDURE — 96366 THER/PROPH/DIAG IV INF ADDON: CPT

## 2020-03-07 PROCEDURE — 80202 ASSAY OF VANCOMYCIN: CPT

## 2020-03-07 PROCEDURE — 2580000003 HC RX 258: Performed by: INTERNAL MEDICINE

## 2020-03-07 PROCEDURE — G0378 HOSPITAL OBSERVATION PER HR: HCPCS

## 2020-03-07 PROCEDURE — 83735 ASSAY OF MAGNESIUM: CPT

## 2020-03-07 PROCEDURE — 85025 COMPLETE CBC W/AUTO DIFF WBC: CPT

## 2020-03-07 PROCEDURE — 80076 HEPATIC FUNCTION PANEL: CPT

## 2020-03-07 PROCEDURE — 99224 PR SBSQ OBSERVATION CARE/DAY 15 MINUTES: CPT | Performed by: SURGERY

## 2020-03-07 PROCEDURE — 2500000003 HC RX 250 WO HCPCS: Performed by: INTERNAL MEDICINE

## 2020-03-07 PROCEDURE — 6360000002 HC RX W HCPCS: Performed by: INTERNAL MEDICINE

## 2020-03-07 PROCEDURE — 36415 COLL VENOUS BLD VENIPUNCTURE: CPT

## 2020-03-07 PROCEDURE — 6370000000 HC RX 637 (ALT 250 FOR IP): Performed by: STUDENT IN AN ORGANIZED HEALTH CARE EDUCATION/TRAINING PROGRAM

## 2020-03-07 PROCEDURE — 80069 RENAL FUNCTION PANEL: CPT

## 2020-03-07 RX ORDER — INSULIN LISPRO 100 [IU]/ML
0-18 INJECTION, SOLUTION INTRAVENOUS; SUBCUTANEOUS
Status: DISCONTINUED | OUTPATIENT
Start: 2020-03-07 | End: 2020-03-07

## 2020-03-07 RX ORDER — DEXTROSE MONOHYDRATE 25 G/50ML
12.5 INJECTION, SOLUTION INTRAVENOUS PRN
Status: DISCONTINUED | OUTPATIENT
Start: 2020-03-07 | End: 2020-03-07 | Stop reason: SDUPTHER

## 2020-03-07 RX ORDER — INSULIN LISPRO 100 [IU]/ML
0-18 INJECTION, SOLUTION INTRAVENOUS; SUBCUTANEOUS
Status: DISCONTINUED | OUTPATIENT
Start: 2020-03-07 | End: 2020-03-07 | Stop reason: HOSPADM

## 2020-03-07 RX ORDER — INSULIN LISPRO 100 [IU]/ML
0-9 INJECTION, SOLUTION INTRAVENOUS; SUBCUTANEOUS NIGHTLY
Status: DISCONTINUED | OUTPATIENT
Start: 2020-03-07 | End: 2020-03-07 | Stop reason: SDUPTHER

## 2020-03-07 RX ADMIN — INSULIN LISPRO 9 UNITS: 100 INJECTION, SOLUTION INTRAVENOUS; SUBCUTANEOUS at 04:34

## 2020-03-07 RX ADMIN — Medication 10 ML: at 08:31

## 2020-03-07 RX ADMIN — SODIUM CHLORIDE: 9 INJECTION, SOLUTION INTRAVENOUS at 03:56

## 2020-03-07 RX ADMIN — CEFEPIME 2 G: 2 INJECTION, POWDER, FOR SOLUTION INTRAMUSCULAR; INTRAVENOUS at 01:40

## 2020-03-07 RX ADMIN — VANCOMYCIN HYDROCHLORIDE 1250 MG: 10 INJECTION, POWDER, LYOPHILIZED, FOR SOLUTION INTRAVENOUS at 00:50

## 2020-03-07 RX ADMIN — VANCOMYCIN HYDROCHLORIDE 1250 MG: 10 INJECTION, POWDER, LYOPHILIZED, FOR SOLUTION INTRAVENOUS at 08:32

## 2020-03-07 RX ADMIN — INSULIN LISPRO 5 UNITS: 100 INJECTION, SOLUTION INTRAVENOUS; SUBCUTANEOUS at 08:31

## 2020-03-07 RX ADMIN — CLINDAMYCIN PHOSPHATE 600 MG: 600 INJECTION, SOLUTION INTRAVENOUS at 03:56

## 2020-03-07 ASSESSMENT — PAIN SCALES - GENERAL
PAINLEVEL_OUTOF10: 0
PAINLEVEL_OUTOF10: 0

## 2020-03-07 NOTE — PROGRESS NOTES
Surgery Daily Progress Note      CC: Left Antecubital abscess    SUBJECTIVE:  Patient underwent I&D per ortho yesterday evening, with copious purulent material expressed. No acute events overnight. Pain is better this AM.     ROS:   A 14 point review of systems was conducted, significant findings as noted above. All other systems negative.     OBJECTIVE:    PHYSICAL EXAM:  Vitals:    03/06/20 1448 03/06/20 1934 03/06/20 2254 03/07/20 0355   BP: 132/79 137/88 138/79 118/76   Pulse: 85 100 96 85   Resp: 16 16 18 16   Temp: 99.4 °F (37.4 °C) 100.2 °F (37.9 °C) 99.4 °F (37.4 °C) 99.2 °F (37.3 °C)   TempSrc: Oral Oral Oral Oral   SpO2: 95% 95% 94% 95%   Weight:       Height:           General Appearance: Alert, unkempt clothing and personal hygiene, appears uncomfortable but no acute distress      HEENT: Normocephalic/atraumatic; no scleral icterus; trachea midline; trachea midline  Chest/Lungs: Normal effort; breathing room air; equal chest rise; no adventitious sounds   Cardiovascular: Tachycardic, normotensive   Abdomen: Non-distended, non-tender, soft  Skin: Warm and dry, multiple sites of track marks bilateral upper extremities both well-healed and also with scabs present  Extremities: left upper extremity wrapped with dressing, erythema improving, compartments soft  Neuro/Psych: A&Ox3; no focal deficits; guarded responses, lack of eye contact     ASSESSMENT & PLAN:   This is a 45 y.o. male with a diagnosis of left antecubital abscess s/p incision and drainage per ortho (03/06)    - wound care per ortho, appreciate consult   - follow up cultures   - continue antibiotics  - general surgery to sign off, please call with questions    Yuni Jung DO  PGY1, General Surgery  03/07/20  6:36 AM  [phone]

## 2020-03-07 NOTE — DISCHARGE SUMMARY
Hospital Medicine Discharge Summary    Patient ID: Joenathan Carrel      Patient's PCP: No primary care provider on file. Admit Date: 3/5/2020     Discharge Date: 3/7/2020     Admitting Physician: Alvino Ellis MD     Discharge Physician: Ya Alves MD     Discharge Diagnoses: Active Hospital Problems    Diagnosis Date Noted    Cellulitis and abscess of upper extremity [L03.119, L02.419]     Sepsis ([address] [A41.9] 03/06/2020       The patient was seen and examined on day of discharge and this discharge summary is in conjunction with any daily progress note from day of discharge. Hospital Course:  Patient is a 60-year-old male with past medical history of poorly controlled diabetes, IV drug abuse, chronic hepatitis C with cirrhosis who presents to the hospital for left upper extremity pain and swelling for 2 days. Patient mentions the swelling has been getting worse and pain has been getting worse. Patient admits to injecting to the arms. Patient also mentions he is not able to move his arm secondary to pain. He also mentions he is on insulin but his diabetes has been poorly controlled and not compliant enough with his medications. Patient denies chest pain shortness of breath nausea vomiting diarrhea constipation dysuria fever, chills. Patient was admitted for left arm cellulitis with antecubital abscess. Patient had I&D performed by general surgery and orthopedic. Patient left AMA before being seen. Patient left before being seen          Exam:     /82   Pulse 80   Temp 98 °F (36.7 °C) (Oral)   Resp 18   Ht 5' 11\" (1.803 m)   Wt 182 lb (82.6 kg)   SpO2 97%   BMI 25.38 kg/m²     General appearance: No apparent distress  HEENT:  Conjunctivae/corneas clear. Neck: Supple, with full range of motion. No jugular venous distention. Trachea midline. Respiratory:  Normal respiratory effort.  Clear to auscultation, bilaterally without

## 2020-03-07 NOTE — PROGRESS NOTES
Pt is currently alert and oriented. No c/o of pain. Pt tolerating diet. LUE remains intact, dry, with old drainage on dressing. LUE is elevated. Pt has scattered abrasions and what looks like needle marks. No other skin issues noted. Has been tolerating IV ABX. Voiding without issues. There is a camera in room for safety. Will continue to monitor.

## 2020-03-10 LAB
BLOOD BANK DISPENSE STATUS: NORMAL
BLOOD BANK DISPENSE STATUS: NORMAL
BLOOD BANK PRODUCT CODE: NORMAL
BLOOD BANK PRODUCT CODE: NORMAL
BLOOD CULTURE, ROUTINE: NORMAL
BPU ID: NORMAL
BPU ID: NORMAL
CULTURE, BLOOD 2: NORMAL
DESCRIPTION BLOOD BANK: NORMAL
DESCRIPTION BLOOD BANK: NORMAL

## 2020-03-10 ASSESSMENT — ENCOUNTER SYMPTOMS
DIARRHEA: 0
EYE PAIN: 0
RHINORRHEA: 0
VOMITING: 0
NAUSEA: 0
ABDOMINAL PAIN: 0
TROUBLE SWALLOWING: 0
SORE THROAT: 0
CHEST TIGHTNESS: 0
SHORTNESS OF BREATH: 0
COLOR CHANGE: 1
ABDOMINAL DISTENTION: 0
COUGH: 0
WHEEZING: 0

## 2020-03-10 NOTE — ED PROVIDER NOTES
antecubital region. Neurovascularly intact distally. Neurological: Negative for dizziness, seizures, weakness, light-headedness and headaches. Hematological: Does not bruise/bleed easily. Psychiatric/Behavioral: Negative for confusion, hallucinations, self-injury and suicidal ideas. All other systems reviewed and are negative. Past Medical, Surgical, Family, and SocialHistory     He has a past medical history of Hearing loss and Tinnitus. He has no past surgical history on file. His family history is not on file. He reports that he has quit smoking. He has never used smokeless tobacco. He reports previous alcohol use. He reports previous drug use. Medications     No home medications. Allergies     He has No Known Allergies. Physical Exam     INITIAL VITALS: BP: (!) 152/105, Temp: 100.7 °F (38.2 °C), Pulse: 114, Resp: 18, SpO2: 99 %   Physical Exam  Vitals signs and nursing note reviewed. Constitutional:       General: He is not in acute distress. Appearance: He is well-developed. He is not ill-appearing or diaphoretic. HENT:      Head: Normocephalic and atraumatic. Eyes:      Pupils: Pupils are equal, round, and reactive to light. Neck:      Musculoskeletal: Normal range of motion and neck supple. Vascular: No JVD. Cardiovascular:      Rate and Rhythm: Regular rhythm. Tachycardia present. Pulmonary:      Effort: Pulmonary effort is normal. No respiratory distress. Breath sounds: Normal breath sounds. No stridor. No wheezing or rales. Chest:      Chest wall: No tenderness. Abdominal:      General: There is no distension. Palpations: Abdomen is soft. Tenderness: There is no abdominal tenderness. Musculoskeletal: Normal range of motion. General: No tenderness. Skin:     General: Skin is warm and dry. Coloration: Skin is not pale. Findings: No erythema or rash. Comments:  The patient demonstrates numerous track marks of 100.0 fL    MCH 31.9 26.0 - 34.0 pg    MCHC 35.1 31.0 - 36.0 g/dL    RDW 13.0 12.4 - 15.4 %    Platelets 079 901 - 258 K/uL    MPV 8.7 5.0 - 10.5 fL    Neutrophils % 80.0 %    Lymphocytes % 12.0 %    Monocytes % 5.0 %    Eosinophils % 0.0 %    Basophils % 0.0 %    Neutrophils Absolute 11.5 (H) 1.7 - 7.7 K/uL    Lymphocytes Absolute 1.7 1.0 - 5.1 K/uL    Monocytes Absolute 0.7 0.0 - 1.3 K/uL    Eosinophils Absolute 0.0 0.0 - 0.6 K/uL    Basophils Absolute 0.0 0.0 - 0.2 K/uL    Bands Relative 1 0 - 7 %    Metamyelocytes Relative 2 (A) %   Basic Metabolic Panel w/ Reflex to MG   Result Value Ref Range    Sodium 127 (L) 136 - 145 mmol/L    Potassium reflex Magnesium 3.5 3.5 - 5.1 mmol/L    Chloride 89 (L) 99 - 110 mmol/L    CO2 26 21 - 32 mmol/L    Anion Gap 12 3 - 16    Glucose 445 (H) 70 - 99 mg/dL    BUN 7 7 - 20 mg/dL    CREATININE 0.6 (L) 0.9 - 1.3 mg/dL    GFR Non-African American >60 >60    GFR African American >60 >60    Calcium 9.1 8.3 - 10.6 mg/dL   Lactic Acid, Plasma   Result Value Ref Range    Lactic Acid 2.8 (H) 0.4 - 2.0 mmol/L   Hepatic Function Panel   Result Value Ref Range    Total Protein 7.5 6.4 - 8.2 g/dL    Alb 3.7 3.4 - 5.0 g/dL    Alkaline Phosphatase 104 40 - 129 U/L    ALT 72 (H) 10 - 40 U/L    AST 21 15 - 37 U/L    Total Bilirubin 0.7 0.0 - 1.0 mg/dL    Bilirubin, Direct <0.2 0.0 - 0.3 mg/dL    Bilirubin, Indirect see below 0.0 - 1.0 mg/dL   Magnesium   Result Value Ref Range    Magnesium 1.60 (L) 1.80 - 2.40 mg/dL   CRP   Result Value Ref Range    CRP 25.3 (H) 0.0 - 5.1 mg/L   Lactic Acid, Plasma   Result Value Ref Range    Lactic Acid 1.2 0.4 - 2.0 mmol/L       ED BEDSIDE ULTRASOUND:  N/A    Procedures     N/A    ED Course     Nursing Notes, Past Medical Hx, Past Surgical Hx, Social Hx, Allergies, and Family Hx were reviewed.     The patient was given the following medications:  Orders Placed This Encounter   Medications    lactated ringers bolus    morphine injection 4 mg    vancomycin unremarkable. ESR and CRP are elevated. Surgery was consulted to evaluate the patient's images and arm to assure that there was no surgical intervention for rapidly developing cellulitis with concern for necrotizing fasciitis versus significant abscess at this time. His LRI and EC score is a 7, concerning for elevated risk of the development of necrotizing fasciitis. His history of IV drug use is also consistent with this. Surgery saw the patient in the emergency department and does not feel that surgical intervention is warranted at this time, but will continue to follow the case. Repeat lactic acid after resuscitation is 1.2. The patient will be admitted to the internal medicine service for continued evaluation and management of sepsis from consult to infection and potential abscess of the left antecubital fossa region. I discussed this plan at length the patient who verbalizes understanding and is in agreement. The patient was seen and evaluated by myself and the physician assistant student, as well as the attending physician, SHANA Sears MD, who agrees with my assessment, treatment and plan. Critical Care:  Due to the immediate potential for life-threatening deterioration due to sepsis, I spent 33 minutes providing critical care. This time excludes time spent performing procedures but includes time spent ondirect patient care, history retrieval, review of the chart, and discussions with patient, family, and consultant(s). Clinical Impression     1.  2.  Cellulitis and abscess of upper extremity   Sepsis       Disposition     PATIENT REFERRED TO:  No follow-up provider specified.     DISCHARGE MEDICATIONS:  Discharge Medication List as of 3/7/2020 12:35 PM          DISPOSITION Admitted 03/06/2020 04:12:30 AM             NOHEMI Salvador  03/10/20 1144

## 2020-03-11 LAB
ANAEROBIC CULTURE: ABNORMAL
GRAM STAIN RESULT: ABNORMAL
ORGANISM: ABNORMAL
WOUND/ABSCESS: ABNORMAL
WOUND/ABSCESS: ABNORMAL

## 2020-07-07 ENCOUNTER — HOSPITAL ENCOUNTER (EMERGENCY)
Age: 40
Discharge: HOME | End: 2020-07-07
Attending: EMERGENCY MEDICINE
Payer: COMMERCIAL

## 2020-07-07 VITALS — TEMPERATURE: 98.3 F | OXYGEN SATURATION: 98 % | RESPIRATION RATE: 19 BRPM | HEART RATE: 108 BPM

## 2020-07-07 LAB
GLUCOSE BLD-MCNC: 207 MG/DL (ref 70–99)
PERFORMED ON: ABNORMAL

## 2020-07-07 PROCEDURE — 99284 EMERGENCY DEPT VISIT MOD MDM: CPT

## 2020-07-07 ASSESSMENT — PAIN SCALES - GENERAL: PAINLEVEL_OUTOF10: 5

## 2020-07-07 NOTE — ED TRIAGE NOTES
Pt was brought by EMT to ED for possible drug overdose. He was found in the parking lot behind his car asleep. Patient has a history of schizophrenia and bi-polar disorder. He has not been on his medication and is not sure the name of what it was he last took.

## 2020-07-07 NOTE — ED PROVIDER NOTES
1 [facility]  EMERGENCY DEPARTMENT ENCOUNTER          ATTENDING PHYSICIAN NOTE       Date of evaluation: 7/7/2020    Chief Complaint     Addiction Problem      History of Present Illness     Zaria Ashford is a 45 y.o. male who presents with a history of poorly controlled diabetes, IV drug abuse, chronic hepatitis C with cirrhosis. Recent admit for ivdu cellulitis. Patient was brought here because his the police said he needed to be checked out. Patient states that he was being harassed by the police. He pulled over the side of the road and fell asleep. They bring him here for evaluation. Patient denies any complaints at this time. Does not want to be here. Dates that he has not taken his insulin or his medications today and outside he is tired. He denies any intravenous drug use although there are track marks on his arm. Denies any methamphetamines although he has a rash of face. Patient is alert and oriented x4. Does not want to be here and his Accu-Chek was 207    Review of Systems     Review of Systems denies any abdominal pain chest pain or shortness of breath. States that he just fell asleep in his car. Otherwise negative review of systems    Past Medical, Surgical, Family, and Social History     He has a past medical history of Hearing loss and Tinnitus. He has no past surgical history on file. His family history is not on file. He reports that he has quit smoking. He has never used smokeless tobacco. He reports previous alcohol use. He reports current drug use. Medications     Previous Medications    ASPIRIN 81 MG TABLET    Take 81 mg by mouth daily       Allergies     He has No Known Allergies. Physical Exam     INITIAL VITALS:  , Temp: 98.3 °F (36.8 °C), Pulse: 108, Resp: 19, SpO2: 98 %   Physical Exam  Vitals signs and nursing note reviewed. Constitutional:       General: He is not in acute distress. Appearance: He is well-developed. He is not diaphoretic.    HENT: Head: Normocephalic and atraumatic. Eyes:      Conjunctiva/sclera: Conjunctivae normal.      Pupils: Pupils are equal, round, and reactive to light. Neck:      Musculoskeletal: Neck supple. Cardiovascular:      Rate and Rhythm: Normal rate and regular rhythm. Pulmonary:      Effort: Pulmonary effort is normal.   Abdominal:      General: There is no distension. Palpations: Abdomen is soft. Tenderness: There is no guarding or rebound. Musculoskeletal: Normal range of motion. Skin:     General: Skin is warm and dry. Coloration: Skin is not jaundiced. Comments: Positive track marks of arms. No erythema or warmth. He has multiple skin lesions especially on his face which are scabbed over. No surrounding erythema of the brown scabbed lesions   Neurological:      Mental Status: He is alert and oriented to person, place, and time. Deep Tendon Reflexes: Abnormal reflex:    Psychiatric:         Behavior: Behavior normal.         Diagnostic Results     EKG   Monitor showed heart rate 98-1 oh 6-1 10 depending on patient's demeanor. He is angry that he is here but when I talked and calm him down his heart rate goes below 100    RADIOLOGY:  No orders to display       LABS:   Results for orders placed or performed during the hospital encounter of 07/07/20   POCT Glucose   Result Value Ref Range    POC Glucose 207 (H) 70 - 99 mg/dl    Performed on ACCU-CHEK        ED BEDSIDE ULTRASOUND:      RECENT VITALS:   ,Temp: 98.3 °F (36.8 °C), Pulse: 108, Resp: 19, SpO2: 98 %     Procedures         ED Course     Nursing Notes, Past Medical Hx, Past Surgical Hx, Social Hx,Allergies, and Family Hx were reviewed. patient was given the following medications:  No orders of the defined types were placed in this encounter.       CONSULTS:  None    MEDICAL DECISIONMAKING / ASSESSMENT / Janett Beth is a 45 y.o. male who was brought to the emerge department after being found in his car on the side of road. He does not want to be here. He was to go home take his medications. His glucose here was 207. He is alert and oriented. He does have a psychiatric history also he says. Suicidal or homicidal ideation. At this point I cannot hold him and he wants to go home but he says a ride coming to pick him up. He says his baby's mother is coming to pick him up but would not divulge her name to me. However back to talk to patient to see if his ride was on its way to come pick him up. He became very angry and said there is holding him against his will. I asked him once again if you have suicidal or homicidal he said no. He knew the date he knew the president and he knew the current circumstances and he eloped from the emergency department. Clinical Impression     1.  Addiction [facility])        Disposition     PATIENT REFERRED TO:  [facility] AT [address]    Schedule an appointment as soon as possible for a visit in 2 days        DISCHARGE MEDICATIONS:  New Prescriptions    No medications on file       DISPOSITION Decision To Discharge 07/07/2020 03:54:52 PM       Tracy Keyes MD  07/07/20 0928

## 2022-09-23 ENCOUNTER — HOSPITAL ENCOUNTER (EMERGENCY)
Age: 42
Discharge: LEFT BEFORE DISCHARGE | End: 2022-09-23
Payer: MEDICAID

## 2022-09-23 VITALS
HEART RATE: 101 BPM | RESPIRATION RATE: 18 BRPM | BODY MASS INDEX: 29.12 KG/M2 | WEIGHT: 208 LBS | SYSTOLIC BLOOD PRESSURE: 178 MMHG | HEIGHT: 71 IN | DIASTOLIC BLOOD PRESSURE: 107 MMHG | OXYGEN SATURATION: 95 % | TEMPERATURE: 98.1 F

## 2022-09-23 LAB
EKG ATRIAL RATE: 103 BPM
EKG DIAGNOSIS: NORMAL
EKG P AXIS: 31 DEGREES
EKG P-R INTERVAL: 132 MS
EKG Q-T INTERVAL: 364 MS
EKG QRS DURATION: 88 MS
EKG QTC CALCULATION (BAZETT): 476 MS
EKG R AXIS: 0 DEGREES
EKG T AXIS: 31 DEGREES
EKG VENTRICULAR RATE: 103 BPM

## 2022-09-23 PROCEDURE — 93005 ELECTROCARDIOGRAM TRACING: CPT | Performed by: STUDENT IN AN ORGANIZED HEALTH CARE EDUCATION/TRAINING PROGRAM

## 2022-09-23 PROCEDURE — 4500000002 HC ER NO CHARGE

## 2022-09-23 RX ORDER — INSULIN GLARGINE 100 [IU]/ML
55 INJECTION, SOLUTION SUBCUTANEOUS NIGHTLY
COMMUNITY

## 2022-09-23 ASSESSMENT — PAIN DESCRIPTION - LOCATION: LOCATION: CHEST

## 2022-09-23 ASSESSMENT — PAIN DESCRIPTION - ORIENTATION: ORIENTATION: LEFT

## 2022-09-23 ASSESSMENT — PAIN DESCRIPTION - DESCRIPTORS: DESCRIPTORS: SHARP

## 2022-09-23 ASSESSMENT — PAIN - FUNCTIONAL ASSESSMENT: PAIN_FUNCTIONAL_ASSESSMENT: 0-10

## 2023-03-07 ENCOUNTER — APPOINTMENT (OUTPATIENT)
Dept: CT IMAGING | Age: 43
End: 2023-03-07
Payer: MEDICARE

## 2023-03-07 ENCOUNTER — HOSPITAL ENCOUNTER (INPATIENT)
Age: 43
LOS: 2 days | Discharge: HOME | End: 2023-03-10
Attending: EMERGENCY MEDICINE | Admitting: INTERNAL MEDICINE
Payer: MEDICARE

## 2023-03-07 DIAGNOSIS — Z22.322 COLONIZATION WITH MULTIDRUG-RESISTANT BACTERIA: ICD-10-CM

## 2023-03-07 DIAGNOSIS — N39.0 COMPLICATED UTI (URINARY TRACT INFECTION): Primary | ICD-10-CM

## 2023-03-07 DIAGNOSIS — N10 ACUTE PYELONEPHRITIS: ICD-10-CM

## 2023-03-07 DIAGNOSIS — N30.80 EMPHYSEMATOUS CYSTITIS: ICD-10-CM

## 2023-03-07 LAB
A/G RATIO: 1.2 (ref 1.1–2.2)
ALBUMIN SERPL-MCNC: 4.2 G/DL (ref 3.4–5)
ALP BLD-CCNC: 99 U/L (ref 40–129)
ALT SERPL-CCNC: 28 U/L (ref 10–40)
ANION GAP SERPL CALCULATED.3IONS-SCNC: 13 MMOL/L (ref 3–16)
AST SERPL-CCNC: 35 U/L (ref 15–37)
BACTERIA: ABNORMAL /HPF
BASE EXCESS VENOUS: 3.3 MMOL/L (ref -2–3)
BASOPHILS ABSOLUTE: 0.1 K/UL (ref 0–0.2)
BASOPHILS RELATIVE PERCENT: 1 %
BILIRUB SERPL-MCNC: 0.4 MG/DL (ref 0–1)
BILIRUBIN URINE: NEGATIVE
BLOOD, URINE: ABNORMAL
BUN BLDV-MCNC: 19 MG/DL (ref 7–20)
CALCIUM SERPL-MCNC: 9.5 MG/DL (ref 8.3–10.6)
CARBOXYHEMOGLOBIN: 4.6 % (ref 0–1.5)
CHLORIDE BLD-SCNC: 98 MMOL/L (ref 99–110)
CLARITY: CLEAR
CO2: 25 MMOL/L (ref 21–32)
COLOR: YELLOW
CREAT SERPL-MCNC: 1.2 MG/DL (ref 0.9–1.3)
EOSINOPHILS ABSOLUTE: 0.2 K/UL (ref 0–0.6)
EOSINOPHILS RELATIVE PERCENT: 2.3 %
GFR SERPL CREATININE-BSD FRML MDRD: >60 ML/MIN/{1.73_M2}
GLUCOSE BLD-MCNC: 233 MG/DL (ref 70–99)
GLUCOSE URINE: >=1000 MG/DL
HCO3 VENOUS: 27.9 MMOL/L (ref 24–28)
HCT VFR BLD CALC: 46.2 % (ref 40.5–52.5)
HEMOGLOBIN, VEN, REDUCED: 4.3 %
HEMOGLOBIN: 16.6 G/DL (ref 13.5–17.5)
KETONES, URINE: NEGATIVE MG/DL
LACTIC ACID, SEPSIS: 1.7 MMOL/L (ref 0.4–1.9)
LEUKOCYTE ESTERASE, URINE: ABNORMAL
LYMPHOCYTES ABSOLUTE: 2.5 K/UL (ref 1–5.1)
LYMPHOCYTES RELATIVE PERCENT: 25.9 %
MCH RBC QN AUTO: 31.2 PG (ref 26–34)
MCHC RBC AUTO-ENTMCNC: 36 G/DL (ref 31–36)
MCV RBC AUTO: 86.7 FL (ref 80–100)
METHEMOGLOBIN VENOUS: <0 % (ref 0–1.5)
MICROSCOPIC EXAMINATION: YES
MONOCYTES ABSOLUTE: 0.7 K/UL (ref 0–1.3)
MONOCYTES RELATIVE PERCENT: 7.1 %
NEUTROPHILS ABSOLUTE: 6.2 K/UL (ref 1.7–7.7)
NEUTROPHILS RELATIVE PERCENT: 63.7 %
NITRITE, URINE: POSITIVE
O2 SAT, VEN: 96 %
PCO2, VEN: 41.3 MMHG (ref 41–51)
PDW BLD-RTO: 14.3 % (ref 12.4–15.4)
PH UA: 6 (ref 5–8)
PH VENOUS: 7.44 (ref 7.35–7.45)
PLATELET # BLD: 198 K/UL (ref 135–450)
PMV BLD AUTO: 8.8 FL (ref 5–10.5)
PO2, VEN: 70.3 MMHG (ref 25–40)
POTASSIUM REFLEX MAGNESIUM: 4.6 MMOL/L (ref 3.5–5.1)
PROTEIN UA: 100 MG/DL
RBC # BLD: 5.34 M/UL (ref 4.2–5.9)
RBC UA: >100 /HPF (ref 0–4)
SODIUM BLD-SCNC: 136 MMOL/L (ref 136–145)
SPECIFIC GRAVITY UA: 1.02 (ref 1–1.03)
TCO2 CALC VENOUS: 29 MMOL/L
TOTAL PROTEIN: 7.8 G/DL (ref 6.4–8.2)
URINE REFLEX TO CULTURE: YES
URINE TYPE: ABNORMAL
UROBILINOGEN, URINE: 0.2 E.U./DL
WBC # BLD: 9.8 K/UL (ref 4–11)
WBC UA: >100 /HPF (ref 0–5)

## 2023-03-07 PROCEDURE — 87086 URINE CULTURE/COLONY COUNT: CPT

## 2023-03-07 PROCEDURE — 82803 BLOOD GASES ANY COMBINATION: CPT

## 2023-03-07 PROCEDURE — 81001 URINALYSIS AUTO W/SCOPE: CPT

## 2023-03-07 PROCEDURE — 80053 COMPREHEN METABOLIC PANEL: CPT

## 2023-03-07 PROCEDURE — 6360000002 HC RX W HCPCS

## 2023-03-07 PROCEDURE — 87184 SC STD DISK METHOD PER PLATE: CPT

## 2023-03-07 PROCEDURE — 87088 URINE BACTERIA CULTURE: CPT

## 2023-03-07 PROCEDURE — 74177 CT ABD & PELVIS W/CONTRAST: CPT

## 2023-03-07 PROCEDURE — 6360000004 HC RX CONTRAST MEDICATION: Performed by: EMERGENCY MEDICINE

## 2023-03-07 PROCEDURE — 85025 COMPLETE CBC W/AUTO DIFF WBC: CPT

## 2023-03-07 PROCEDURE — 87186 SC STD MICRODIL/AGAR DIL: CPT

## 2023-03-07 PROCEDURE — 96365 THER/PROPH/DIAG IV INF INIT: CPT

## 2023-03-07 PROCEDURE — 99285 EMERGENCY DEPT VISIT HI MDM: CPT

## 2023-03-07 PROCEDURE — 87040 BLOOD CULTURE FOR BACTERIA: CPT

## 2023-03-07 PROCEDURE — 2580000003 HC RX 258

## 2023-03-07 PROCEDURE — 83605 ASSAY OF LACTIC ACID: CPT

## 2023-03-07 RX ORDER — FLUOXETINE HYDROCHLORIDE 20 MG/1
CAPSULE ORAL DAILY
COMMUNITY
Start: 2022-10-24

## 2023-03-07 RX ORDER — ATORVASTATIN CALCIUM 40 MG/1
TABLET, FILM COATED ORAL
COMMUNITY
Start: 2023-01-19

## 2023-03-07 RX ORDER — QUETIAPINE FUMARATE 400 MG/1
TABLET, FILM COATED ORAL NIGHTLY
COMMUNITY
Start: 2022-10-24

## 2023-03-07 RX ORDER — INSULIN DETEMIR 100 [IU]/ML
INJECTION, SOLUTION SUBCUTANEOUS
COMMUNITY
Start: 2023-01-19 | End: 2023-03-07

## 2023-03-07 RX ORDER — AMLODIPINE BESYLATE 10 MG/1
TABLET ORAL DAILY
Status: ON HOLD | COMMUNITY
Start: 2021-05-07 | End: 2023-03-10 | Stop reason: HOSPADM

## 2023-03-07 RX ORDER — CALCIUM CITRATE/VITAMIN D3 200MG-6.25
TABLET ORAL
COMMUNITY
Start: 2023-01-19

## 2023-03-07 RX ORDER — LISINOPRIL 40 MG/1
TABLET ORAL
COMMUNITY
Start: 2023-01-19

## 2023-03-07 RX ORDER — DULAGLUTIDE 1.5 MG/.5ML
INJECTION, SOLUTION SUBCUTANEOUS
COMMUNITY
Start: 2023-01-19

## 2023-03-07 RX ADMIN — PIPERACILLIN SODIUM AND TAZOBACTAM SODIUM 3375 MG: 3; .375 INJECTION, POWDER, LYOPHILIZED, FOR SOLUTION INTRAVENOUS at 22:44

## 2023-03-07 RX ADMIN — IOPAMIDOL 75 ML: 755 INJECTION, SOLUTION INTRAVENOUS at 23:19

## 2023-03-07 ASSESSMENT — ENCOUNTER SYMPTOMS
COUGH: 0
EYE ITCHING: 0
VOMITING: 0
BACK PAIN: 0
SHORTNESS OF BREATH: 0
WHEEZING: 0
RHINORRHEA: 0
ABDOMINAL PAIN: 0
SORE THROAT: 0
NAUSEA: 1
CONSTIPATION: 0
EYE PAIN: 0
EYE DISCHARGE: 0
DIARRHEA: 0

## 2023-03-07 ASSESSMENT — PAIN - FUNCTIONAL ASSESSMENT: PAIN_FUNCTIONAL_ASSESSMENT: 0-10

## 2023-03-07 ASSESSMENT — PAIN DESCRIPTION - LOCATION: LOCATION: FLANK

## 2023-03-07 ASSESSMENT — PAIN DESCRIPTION - ORIENTATION: ORIENTATION: RIGHT

## 2023-03-07 ASSESSMENT — PAIN SCALES - GENERAL: PAINLEVEL_OUTOF10: 6

## 2023-03-08 PROBLEM — N39.0 UTI (URINARY TRACT INFECTION): Status: ACTIVE | Noted: 2023-03-08

## 2023-03-08 PROBLEM — E78.5 HYPERLIPIDEMIA: Status: ACTIVE | Noted: 2023-03-08

## 2023-03-08 PROBLEM — N30.80 EMPHYSEMATOUS CYSTITIS: Status: ACTIVE | Noted: 2023-03-08

## 2023-03-08 PROBLEM — R16.1 SPLENOMEGALY, NOT ELSEWHERE CLASSIFIED: Status: ACTIVE | Noted: 2023-03-08

## 2023-03-08 PROBLEM — Z86.19 HISTORY OF INFECTION WITH VANCOMYCIN RESISTANT ENTEROCOCCUS (VRE): Status: ACTIVE | Noted: 2023-03-08

## 2023-03-08 PROBLEM — E11.65 UNCONTROLLED TYPE 2 DIABETES MELLITUS WITH HYPERGLYCEMIA (HCC): Status: ACTIVE | Noted: 2023-03-08

## 2023-03-08 PROBLEM — I10 UNCONTROLLED HYPERTENSION: Status: ACTIVE | Noted: 2023-03-08

## 2023-03-08 PROBLEM — R59.0 RETROPERITONEAL LYMPHADENOPATHY: Status: ACTIVE | Noted: 2023-03-08

## 2023-03-08 LAB
AMPHETAMINE SCREEN, URINE: NORMAL
ANION GAP SERPL CALCULATED.3IONS-SCNC: 11 MMOL/L (ref 3–16)
BARBITURATE SCREEN URINE: NORMAL
BENZODIAZEPINE SCREEN, URINE: NORMAL
BUN BLDV-MCNC: 17 MG/DL (ref 7–20)
CALCIUM SERPL-MCNC: 9.3 MG/DL (ref 8.3–10.6)
CANNABINOID SCREEN URINE: NORMAL
CHLORIDE BLD-SCNC: 100 MMOL/L (ref 99–110)
CO2: 26 MMOL/L (ref 21–32)
COCAINE METABOLITE SCREEN URINE: NORMAL
CREAT SERPL-MCNC: 1.3 MG/DL (ref 0.9–1.3)
FENTANYL SCREEN, URINE: NORMAL
GFR SERPL CREATININE-BSD FRML MDRD: >60 ML/MIN/{1.73_M2}
GLUCOSE BLD-MCNC: 180 MG/DL (ref 70–99)
GLUCOSE BLD-MCNC: 190 MG/DL (ref 70–99)
GLUCOSE BLD-MCNC: 213 MG/DL (ref 70–99)
GLUCOSE BLD-MCNC: 218 MG/DL (ref 70–99)
GLUCOSE BLD-MCNC: 232 MG/DL (ref 70–99)
GLUCOSE BLD-MCNC: 235 MG/DL (ref 70–99)
LACTIC ACID, SEPSIS: 1 MMOL/L (ref 0.4–1.9)
Lab: NORMAL
MAGNESIUM: 1.8 MG/DL (ref 1.8–2.4)
METHADONE SCREEN, URINE: NORMAL
OPIATE SCREEN URINE: NORMAL
OXYCODONE URINE: NORMAL
PERFORMED ON: ABNORMAL
PH UA: 7
PHENCYCLIDINE SCREEN URINE: NORMAL
POTASSIUM REFLEX MAGNESIUM: 3.6 MMOL/L (ref 3.5–5.1)
SODIUM BLD-SCNC: 137 MMOL/L (ref 136–145)
TOTAL CK: 391 U/L (ref 39–308)

## 2023-03-08 PROCEDURE — 6360000002 HC RX W HCPCS

## 2023-03-08 PROCEDURE — 80307 DRUG TEST PRSMV CHEM ANLYZR: CPT

## 2023-03-08 PROCEDURE — 51798 US URINE CAPACITY MEASURE: CPT

## 2023-03-08 PROCEDURE — 83735 ASSAY OF MAGNESIUM: CPT

## 2023-03-08 PROCEDURE — 80048 BASIC METABOLIC PNL TOTAL CA: CPT

## 2023-03-08 PROCEDURE — 83036 HEMOGLOBIN GLYCOSYLATED A1C: CPT

## 2023-03-08 PROCEDURE — 83605 ASSAY OF LACTIC ACID: CPT

## 2023-03-08 PROCEDURE — 87040 BLOOD CULTURE FOR BACTERIA: CPT

## 2023-03-08 PROCEDURE — 1200000000 HC SEMI PRIVATE

## 2023-03-08 PROCEDURE — 99222 1ST HOSP IP/OBS MODERATE 55: CPT | Performed by: INTERNAL MEDICINE

## 2023-03-08 PROCEDURE — 2580000003 HC RX 258

## 2023-03-08 PROCEDURE — 82550 ASSAY OF CK (CPK): CPT

## 2023-03-08 PROCEDURE — 6370000000 HC RX 637 (ALT 250 FOR IP)

## 2023-03-08 PROCEDURE — 36415 COLL VENOUS BLD VENIPUNCTURE: CPT

## 2023-03-08 RX ORDER — SODIUM CHLORIDE 9 MG/ML
INJECTION, SOLUTION INTRAVENOUS PRN
Status: DISCONTINUED | OUTPATIENT
Start: 2023-03-08 | End: 2023-03-10 | Stop reason: HOSPADM

## 2023-03-08 RX ORDER — QUETIAPINE FUMARATE 200 MG/1
400 TABLET, FILM COATED ORAL NIGHTLY
Status: DISCONTINUED | OUTPATIENT
Start: 2023-03-08 | End: 2023-03-10 | Stop reason: HOSPADM

## 2023-03-08 RX ORDER — ONDANSETRON 2 MG/ML
4 INJECTION INTRAMUSCULAR; INTRAVENOUS EVERY 6 HOURS PRN
Status: DISCONTINUED | OUTPATIENT
Start: 2023-03-08 | End: 2023-03-10 | Stop reason: HOSPADM

## 2023-03-08 RX ORDER — POLYETHYLENE GLYCOL 3350 17 G/17G
17 POWDER, FOR SOLUTION ORAL DAILY PRN
Status: DISCONTINUED | OUTPATIENT
Start: 2023-03-08 | End: 2023-03-10 | Stop reason: HOSPADM

## 2023-03-08 RX ORDER — SODIUM CHLORIDE 0.9 % (FLUSH) 0.9 %
5-40 SYRINGE (ML) INJECTION PRN
Status: DISCONTINUED | OUTPATIENT
Start: 2023-03-08 | End: 2023-03-10 | Stop reason: HOSPADM

## 2023-03-08 RX ORDER — ASPIRIN 81 MG/1
81 TABLET ORAL DAILY
Status: DISCONTINUED | OUTPATIENT
Start: 2023-03-08 | End: 2023-03-10 | Stop reason: HOSPADM

## 2023-03-08 RX ORDER — ATORVASTATIN CALCIUM 40 MG/1
40 TABLET, FILM COATED ORAL DAILY
Status: DISCONTINUED | OUTPATIENT
Start: 2023-03-08 | End: 2023-03-10 | Stop reason: HOSPADM

## 2023-03-08 RX ORDER — GLUCAGON 1 MG/ML
1 KIT INJECTION PRN
Status: DISCONTINUED | OUTPATIENT
Start: 2023-03-08 | End: 2023-03-10 | Stop reason: HOSPADM

## 2023-03-08 RX ORDER — INSULIN LISPRO 100 [IU]/ML
0-4 INJECTION, SOLUTION INTRAVENOUS; SUBCUTANEOUS NIGHTLY
Status: DISCONTINUED | OUTPATIENT
Start: 2023-03-08 | End: 2023-03-10 | Stop reason: HOSPADM

## 2023-03-08 RX ORDER — ENOXAPARIN SODIUM 100 MG/ML
40 INJECTION SUBCUTANEOUS DAILY
Status: DISCONTINUED | OUTPATIENT
Start: 2023-03-08 | End: 2023-03-08

## 2023-03-08 RX ORDER — QUETIAPINE FUMARATE 100 MG/1
TABLET, FILM COATED ORAL
Status: ON HOLD | COMMUNITY
End: 2023-03-10 | Stop reason: HOSPADM

## 2023-03-08 RX ORDER — ACETAMINOPHEN 650 MG/1
650 SUPPOSITORY RECTAL EVERY 6 HOURS PRN
Status: DISCONTINUED | OUTPATIENT
Start: 2023-03-08 | End: 2023-03-10 | Stop reason: HOSPADM

## 2023-03-08 RX ORDER — INSULIN LISPRO 100 [IU]/ML
0-4 INJECTION, SOLUTION INTRAVENOUS; SUBCUTANEOUS NIGHTLY
Status: DISCONTINUED | OUTPATIENT
Start: 2023-03-08 | End: 2023-03-08 | Stop reason: SDUPTHER

## 2023-03-08 RX ORDER — AMLODIPINE BESYLATE 10 MG/1
10 TABLET ORAL DAILY
Status: DISCONTINUED | OUTPATIENT
Start: 2023-03-08 | End: 2023-03-10 | Stop reason: HOSPADM

## 2023-03-08 RX ORDER — INSULIN LISPRO 100 [IU]/ML
0-4 INJECTION, SOLUTION INTRAVENOUS; SUBCUTANEOUS
Status: DISCONTINUED | OUTPATIENT
Start: 2023-03-08 | End: 2023-03-08

## 2023-03-08 RX ORDER — DEXTROSE MONOHYDRATE 100 MG/ML
INJECTION, SOLUTION INTRAVENOUS CONTINUOUS PRN
Status: DISCONTINUED | OUTPATIENT
Start: 2023-03-08 | End: 2023-03-10 | Stop reason: HOSPADM

## 2023-03-08 RX ORDER — ONDANSETRON 4 MG/1
4 TABLET, ORALLY DISINTEGRATING ORAL EVERY 8 HOURS PRN
Status: DISCONTINUED | OUTPATIENT
Start: 2023-03-08 | End: 2023-03-10 | Stop reason: HOSPADM

## 2023-03-08 RX ORDER — SODIUM CHLORIDE 0.9 % (FLUSH) 0.9 %
5-40 SYRINGE (ML) INJECTION EVERY 12 HOURS SCHEDULED
Status: DISCONTINUED | OUTPATIENT
Start: 2023-03-08 | End: 2023-03-10 | Stop reason: HOSPADM

## 2023-03-08 RX ORDER — INSULIN LISPRO 100 [IU]/ML
0-8 INJECTION, SOLUTION INTRAVENOUS; SUBCUTANEOUS
Status: DISCONTINUED | OUTPATIENT
Start: 2023-03-08 | End: 2023-03-10 | Stop reason: HOSPADM

## 2023-03-08 RX ORDER — ACETAMINOPHEN 325 MG/1
650 TABLET ORAL EVERY 6 HOURS PRN
Status: DISCONTINUED | OUTPATIENT
Start: 2023-03-08 | End: 2023-03-10 | Stop reason: HOSPADM

## 2023-03-08 RX ORDER — LISINOPRIL 40 MG/1
40 TABLET ORAL DAILY
Status: DISCONTINUED | OUTPATIENT
Start: 2023-03-08 | End: 2023-03-10 | Stop reason: HOSPADM

## 2023-03-08 RX ADMIN — SODIUM CHLORIDE 50 ML: 9 INJECTION, SOLUTION INTRAVENOUS at 07:51

## 2023-03-08 RX ADMIN — SODIUM CHLORIDE, PRESERVATIVE FREE 10 ML: 5 INJECTION INTRAVENOUS at 09:03

## 2023-03-08 RX ADMIN — MEROPENEM 1000 MG: 1 INJECTION, POWDER, FOR SOLUTION INTRAVENOUS at 04:13

## 2023-03-08 RX ADMIN — AMLODIPINE BESYLATE 10 MG: 10 TABLET ORAL at 09:03

## 2023-03-08 RX ADMIN — MEROPENEM 1000 MG: 1 INJECTION, POWDER, FOR SOLUTION INTRAVENOUS at 20:12

## 2023-03-08 RX ADMIN — MEROPENEM 1000 MG: 1 INJECTION, POWDER, FOR SOLUTION INTRAVENOUS at 11:37

## 2023-03-08 RX ADMIN — ATORVASTATIN CALCIUM 40 MG: 40 TABLET, FILM COATED ORAL at 09:03

## 2023-03-08 RX ADMIN — VANCOMYCIN HYDROCHLORIDE 2000 MG: 10 INJECTION, POWDER, LYOPHILIZED, FOR SOLUTION INTRAVENOUS at 07:52

## 2023-03-08 RX ADMIN — ASPIRIN 81 MG: 81 TABLET, COATED ORAL at 09:03

## 2023-03-08 RX ADMIN — INSULIN LISPRO 2 UNITS: 100 INJECTION, SOLUTION INTRAVENOUS; SUBCUTANEOUS at 12:59

## 2023-03-08 RX ADMIN — LISINOPRIL 40 MG: 40 TABLET ORAL at 09:03

## 2023-03-08 RX ADMIN — INSULIN GLARGINE 35 UNITS: 100 INJECTION, SOLUTION SUBCUTANEOUS at 21:09

## 2023-03-08 ASSESSMENT — ENCOUNTER SYMPTOMS
DIARRHEA: 0
VOMITING: 0
NAUSEA: 0
SHORTNESS OF BREATH: 0
BACK PAIN: 1

## 2023-03-08 NOTE — CONSULTS
Infectious Diseases   Consult Note      Reason for Consult: Emphysematous cystitis  Requesting Physician: Dr. Marito Pak    Date of Admission: 3/7/2023  Subjective:   CHIEF COMPLAINT: Hematuria and right flank pain    HPI:    43-year-old male with history of HTN, DM 2, schizophrenia, hepatitis C with cirrhosis, drug use, recurrent bouts of urinary tract infections that presented on 3/7 with complaints of right-sided flank pain and hematuria. The patient states that in the past week right flank pain radiating towards right groin has been increasing in severity and associated with nausea. He denies any fevers or chills. Upon presentation in the ED, his WBC was noted to be 9.8 and he was afebrile. He was started on vancomycin and meropenem. With reported history of VRE and MDR E. coli infection, urine culture from 11/2020. A UA showed large blood, positive nitrites and small leukocyte esterase, greater than 100 WBCs and 3+ bacteria, urine culture was ordered which is pending, blood cultures x2 collected on 3/7 have been no growth to date. A CT scan of his abdomen and pelvis performed on 3/7 showed evidence of localized emphysematous cystitis in the posterior right bladder wall with additional air in the urinary bladder anteriorly. There is no evidence of hydronephrosis of pyelonephritis. Current abx: Meropenem         Past Surgical History:       Diagnosis Date    Hearing loss     History of infection with vancomycin resistant Enterococcus (VRE)     HLD (hyperlipidemia)     HTN (hypertension)     Tinnitus     Uncontrolled hypertension 03/08/2023    Uncontrolled type 2 diabetes mellitus with hyperglycemia ([facility] [address] 03/08/2023     History reviewed. No pertinent surgical history. Social History:    TOBACCO:   reports that he has quit smoking. He has never used smokeless tobacco.  ETOH:   reports that he does not currently use alcohol.   There is no history of illicit drug use or other significant epidemiologic exposures. Family History:   History reviewed. No pertinent family history. There is no family history of autoimmune diseases or significant infectious diseases.     Current Medications:    Current Facility-Administered Medications: amLODIPine (NORVASC) tablet 10 mg, 10 mg, Oral, Daily  aspirin EC tablet 81 mg, 81 mg, Oral, Daily  atorvastatin (LIPITOR) tablet 40 mg, 40 mg, Oral, Daily  lisinopril (PRINIVIL;ZESTRIL) tablet 40 mg, 40 mg, Oral, Daily  glucose chewable tablet 16 g, 4 tablet, Oral, PRN  dextrose bolus 10% 125 mL, 125 mL, IntraVENous, PRN **OR** dextrose bolus 10% 250 mL, 250 mL, IntraVENous, PRN  glucagon injection 1 mg, 1 mg, SubCUTAneous, PRN  dextrose 10 % infusion, , IntraVENous, Continuous PRN  insulin glargine (LANTUS;BASAGLAR) injection pen 35 Units, 35 Units, SubCUTAneous, Nightly  sodium chloride flush 0.9 % injection 5-40 mL, 5-40 mL, IntraVENous, 2 times per day  sodium chloride flush 0.9 % injection 5-40 mL, 5-40 mL, IntraVENous, PRN  0.9 % sodium chloride infusion, , IntraVENous, PRN  ondansetron (ZOFRAN-ODT) disintegrating tablet 4 mg, 4 mg, Oral, Q8H PRN **OR** ondansetron (ZOFRAN) injection 4 mg, 4 mg, IntraVENous, Q6H PRN  acetaminophen (TYLENOL) tablet 650 mg, 650 mg, Oral, Q6H PRN **OR** acetaminophen (TYLENOL) suppository 650 mg, 650 mg, Rectal, Q6H PRN  polyethylene glycol (GLYCOLAX) packet 17 g, 17 g, Oral, Daily PRN  [COMPLETED] meropenem (MERREM) 1,000 mg in sodium chloride 0.9 % 100 mL IVPB (mini-bag), 1,000 mg, IntraVENous, Once **FOLLOWED BY** meropenem (MERREM) 1,000 mg in sodium chloride 0.9 % 100 mL IVPB (mini-bag), 1,000 mg, IntraVENous, Q8H  QUEtiapine (SEROQUEL) tablet 400 mg, 400 mg, Oral, Nightly  insulin lispro (1 Unit Dial) (HUMALOG/ADMELOG) pen 0-8 Units, 0-8 Units, SubCUTAneous, TID WC  insulin lispro (1 Unit Dial) (HUMALOG/ADMELOG) pen 0-4 Units, 0-4 Units, SubCUTAneous, Nightly    No Known Allergies     REVIEW OF SYSTEMS:    CONSTITUTIONAL: negative for fevers, chills, diaphoresis, activity change, appetite change, fatigue, night sweats and unexpected weight change. EYES:  negative for blurred vision, eye discharge, visual disturbance and icterus  HEENT:  negative for hearing loss, tinnitus, ear drainage, sinus pressure, nasal congestion, epistaxis and snoring  RESPIRATORY:  No cough, shortness of breath, hemoptysis  CARDIOVASCULAR:  negative for chest pain, palpitations, exertional chest pressure/discomfort, edema, syncope  GASTROINTESTINAL:  positive for nausea, negative for vomiting, diarrhea, constipation, blood in stool and abdominal pain  GENITOURINARY:  positive for frequency, dysuria, hematuria, right flank pain, right groin pain. HEMATOLOGIC/LYMPHATIC:  negative for easy bruising, bleeding and lymphadenopathy  ALLERGIC/IMMUNOLOGIC:  negative for recurrent infections, angioedema, anaphylaxis and drug reactions  ENDOCRINE:  negative for weight changes and diabetic symptoms including polyuria, polydipsia and polyphagia  MUSCULOSKELETAL:  negative for  pain, joint swelling, decreased range of motion and muscle weakness  NEUROLOGICAL:  negative for headaches, slurred speech, unilateral weakness  PSYCHIATRIC/BEHAVIORAL: negative for hallucinations, behavioral problems, confusion and agitation. Objective:   PHYSICAL EXAM:      VITALS:  BP (!) 145/96   Pulse 65   Temp 98.6 °F (37 °C) (Oral)   Resp 18   Ht 5' 11\" (1.803 m)   Wt 210 lb 12.2 oz (95.6 kg)   SpO2 96%   BMI 29.40 kg/m²      24HR INTAKE/OUTPUT:    Intake/Output Summary (Last 24 hours) at 3/8/2023 0936  Last data filed at 3/8/2023 0908  Gross per 24 hour   Intake 99.92 ml   Output 800 ml   Net -700.08 ml     CONSTITUTIONAL:  Awake, alert, cooperative, no apparent distress, and appears stated age  [de-identified]: NCAT, PERRL, EOMI. Sclera white, conjunctive full.   OP with moist mucosal membranes, no thrush, tongue protrudes midline  NECK:  Supple, symmetrical, trachea midline, no adenopathy  LUNGS:  no increased work of breathing and clear to auscultation. No accessory muscle use  CARDIOVASCULAR: S1 and S2, no murmur  ABDOMEN:  normal bowel sounds, right-sided flank pain and CVA tenderness, pain radiates towards the right groin. There is hepatomegaly, nontender otherwise  LYMPHADENOPATHY:  no axillary or supraclavicular adenopathy. No cervical adnenopathy  PSYCHIATRIC: Oriented to person place and time. No obvious depression or anxiety. MUSCULOSKELETAL: No obvious misalignment or effusion of the joints. No clubbing, cyanosis of the digits. SKIN:  normal skin color, texture, turgor and no redness, warmth, or swelling. No palpable nodules or stigmata of embolic phenomenon  NEUROLOGIC: nonfocal exam  ACCESS: PIV    DATA:    Old records have been reviewed    CBC:  Recent Labs     03/07/23 2205   WBC 9.8   RBC 5.34   HGB 16.6   HCT 46.2      MCV 86.7   MCH 31.2   MCHC 36.0   RDW 14.3      BMP:  Recent Labs     03/07/23 2205 03/08/23  0318    137   K 4.6 3.6   CL 98* 100   CO2 25 26   BUN 19 17   CREATININE 1.2 1.3   CALCIUM 9.5 9.3   GLUCOSE 233* 218*        Cultures:   Blood cultures x2 3/7: No growth to date  Urine culture 3/7 pending      Radiology Review:  All pertinent images / reports were reviewed as a part of this visit. CT of the abdomen and pelvis with IV contrast done on 3/7  CT ABDOMEN PELVIS W IV CONTRAST Additional Contrast? None   Final Result   1. Evidence of localized emphysematous cystitis in the posterior right bladder wall, with additional air in the urinary bladder anteriorly. 2. Normal echotexture with no hydronephrosis or pyelonephritis. Fetal lobulation is observed. 3. Hepatic steatosis   4. Splenomegaly and evidence of retroperitoneal lymphadenopathy.    5. Few scattered sigmoid diverticula, diverticulosis           Assessment:     Patient Active Problem List   Diagnosis    Sepsis ([address]    Cellulitis and abscess of upper extremity    Uncontrolled hypertension    Uncontrolled type 2 diabetes mellitus with hyperglycemia (HCC)    History of infection with vancomycin resistant Enterococcus (VRE)    Hyperlipidemia    Splenomegaly, not elsewhere classified    Retroperitoneal lymphadenopathy    Emphysematous cystitis    UTI (urinary tract infection)     Plan:   52-year-old male with history of HTN, DM 2, schizophrenia, hepatitis C with cirrhosis, drug use, recurrent bouts of urinary tract infections that presented on 3/7 with complaints of right-sided flank pain and hematuria. The patient states that in the past week right flank pain radiating towards right groin has been increasing in severity and associated with nausea. Emphysematous cystitis:  - Evidence of localized emphysematous cystitis in the posterior right bladder wall, with additional air in the urinary bladder anteriorly on CT.  - No evidence of pyelonephritis or hydronephrosis as per CT however physical exam signs concerning for onset of this. - Recommend urology evaluation with these findings. - Urinalysis was positive, urine culture is pending at this time. - Given the history of MDR E. coli infection in 2020, reasonable to continue meropenem until further results from urine culture. - Patient received a dose of vancomycin in the ER, would not recommend continuing at this time as staph is not a typical pathogen in the  tract. - further recs pending results of cultures. Poorly controlled type 2 diabetes:  - Last A1c was elevated at 12.9  - Needs better glycemic control in order to prevent further infections and aid in recovery. Hepatitis C with cirrhosis:  - states he was treated at [facility] and cured. Denies active drug use. Medical Decision Making:   The following items were considered in medical decision making:  Discussion of patient care with other providers  Reviewed clinical lab tests  Reviewed radiology tests  Reviewed other diagnostic tests/interventions  Independent review of radiologic images  Microbiology cultures and other micro tests reviewed      Risk of Complications/Morbidity: High   Illness(es)/ Infection present that pose threat to bodily function. There is potential for severe exacerbation of infection/side effects of treatment.   Therapy requires intensive monitoring for antimicrobial agent toxicity    Discussed with pt and primary team.   Lore Causey MD

## 2023-03-08 NOTE — ED PROVIDER NOTES
[address] [facility] [address] ENCOUNTER          PHYSICIAN ASSISTANT NOTE       Date of evaluation: 3/7/2023    Chief Complaint     Hematuria (States he noticed some blood in his urine, states the urine was pink. Reports bilat flank but worse on the right side. )      History of Present Illness     Shannon Maier is a 44 y.o. male who presents to the emergency department with right-sided flank pain, hematuria. The patient states that he had right flank pain that started a couple of days ago. It is intermittent and states that it becomes so severe last about 5 minutes that he is doubled over. Also gets nauseous with this but denies any emesis. He states today he began to have blood in his urine. Initially this afternoon he noted that his urine was dark red. Since then it has cleared up little bit and is more pink-tinged. He denies any fever, chest pain, shortness of breath, diarrhea, dysuria. Denies any history of kidney stones. Does have a history of MDRO UTIs. Also has a history of type 2 diabetes, hypertension, hyperlipidemia and schizophrenia. He currently has no other complaints today. Also states when he was admitted several years ago he was urinating some air. This eventually resolved and states that recently this had recurred again. Review of Systems     Review of Systems   Constitutional:  Negative for chills, fatigue and fever. HENT:  Negative for congestion, rhinorrhea and sore throat. Eyes:  Negative for pain, discharge and itching. Respiratory:  Negative for cough, shortness of breath and wheezing. Cardiovascular:  Negative for chest pain, palpitations and leg swelling. Gastrointestinal:  Positive for nausea. Negative for abdominal pain, constipation, diarrhea and vomiting. Genitourinary:  Positive for flank pain and hematuria. Musculoskeletal:  Negative for back pain, gait problem and neck pain. Neurological:  Negative for syncope and light-headedness. Psychiatric/Behavioral:  Negative for confusion. Past Medical, Surgical, Family, and Social History     He has a past medical history of Hearing loss and Tinnitus. He has no past surgical history on file. His family history is not on file. He reports that he has quit smoking. He has never used smokeless tobacco. He reports that he does not currently use alcohol. He reports current drug use. Medications     Previous Medications    AMLODIPINE (NORVASC) 10 MG TABLET    Take by mouth daily    ASPIRIN 81 MG TABLET    Take 81 mg by mouth daily    ATORVASTATIN (LIPITOR) 40 MG TABLET    TAKE 1 TABLET BY MOUTH EVERY DAY    BLOOD GLUCOSE TEST STRIPS (TRUE METRIX BLOOD GLUCOSE TEST) STRIP    Test 1 - 2 times a day as needed    DULAGLUTIDE (TRULICITY) 1.5 LF/8.7UE SC INJECTION    Inject into the skin every 7 days    FLUOXETINE (PROZAC) 20 MG CAPSULE    Take by mouth daily    INSULIN ASPART (NOVOLOG) 100 UNIT/ML INJECTION PEN    Inject into the skin 3 times daily (before meals)    INSULIN GLARGINE (LANTUS) 100 UNIT/ML INJECTION VIAL    Inject 55 Units into the skin nightly    LISINOPRIL (PRINIVIL;ZESTRIL) 40 MG TABLET    TAKE 1 TABLET BY MOUTH EVERY DAY    METFORMIN (GLUCOPHAGE) 1000 MG TABLET    Take 1,000 mg by mouth 2 times daily (with meals)    QUETIAPINE (SEROQUEL) 400 MG TABLET    Take by mouth nightly       Allergies     He has No Known Allergies. Physical Exam     INITIAL VITALS: BP: (!) 186/122 (took bp multiple times, did not take bp med today), Temp: 98.1 °F (36.7 °C), Heart Rate: (!) 102, Resp: 16, SpO2: 93 %  Physical Exam  Constitutional:       General: He is not in acute distress. Appearance: Normal appearance. He is obese. He is not ill-appearing, toxic-appearing or diaphoretic. HENT:      Head: Normocephalic and atraumatic.       Right Ear: External ear normal.      Left Ear: External ear normal.      Nose: Nose normal.      Mouth/Throat:      Mouth: Mucous membranes are moist.   Eyes: Comments: Cataract in left eye   Cardiovascular:      Rate and Rhythm: Normal rate and regular rhythm. Pulses: Normal pulses. Heart sounds: Normal heart sounds. No murmur heard. No gallop. Abdominal:      General: Abdomen is flat. Bowel sounds are normal. There is no distension. Palpations: Abdomen is soft. Tenderness: There is no abdominal tenderness. There is right CVA tenderness. There is no left CVA tenderness, guarding or rebound. Musculoskeletal:      Cervical back: Normal range of motion and neck supple. Right lower leg: No edema. Left lower leg: No edema. Skin:     General: Skin is warm and dry. Capillary Refill: Capillary refill takes less than 2 seconds. Neurological:      Mental Status: He is alert and oriented to person, place, and time. Psychiatric:         Mood and Affect: Mood normal.         Behavior: Behavior normal.       Diagnostic Results     RADIOLOGY:  CT ABDOMEN PELVIS W IV CONTRAST Additional Contrast? None   Final Result   1. Evidence of localized emphysematous cystitis in the posterior right bladder wall, with additional air in the urinary bladder anteriorly. 2. Normal echotexture with no hydronephrosis or pyelonephritis. Fetal lobulation is observed. 3. Hepatic steatosis   4. Splenomegaly and evidence of retroperitoneal lymphadenopathy.    5. Few scattered sigmoid diverticula, diverticulosis          LABS:   Results for orders placed or performed during the hospital encounter of 03/07/23   Urinalysis with Reflex to Culture    Specimen: Urine   Result Value Ref Range    Color, UA Yellow Straw/Yellow    Clarity, UA Clear Clear    Glucose, Ur >=1000 (A) Negative mg/dL    Bilirubin Urine Negative Negative    Ketones, Urine Negative Negative mg/dL    Specific Gravity, UA 1.025 1.005 - 1.030    Blood, Urine LARGE (A) Negative    pH, UA 6.0 5.0 - 8.0    Protein,  (A) Negative mg/dL    Urobilinogen, Urine 0.2 <2.0 E.U./dL    Nitrite, Urine POSITIVE (A) Negative    Leukocyte Esterase, Urine SMALL (A) Negative    Microscopic Examination YES     Urine Type Voided     Urine Reflex to Culture Yes    Microscopic Urinalysis   Result Value Ref Range    WBC, UA >100 (A) 0 - 5 /HPF    RBC, UA >100 (A) 0 - 4 /HPF    Bacteria, UA 3+ (A) None Seen /HPF   CBC with Auto Differential   Result Value Ref Range    WBC 9.8 4.0 - 11.0 K/uL    RBC 5.34 4.20 - 5.90 M/uL    Hemoglobin 16.6 13.5 - 17.5 g/dL    Hematocrit 46.2 40.5 - 52.5 %    MCV 86.7 80.0 - 100.0 fL    MCH 31.2 26.0 - 34.0 pg    MCHC 36.0 31.0 - 36.0 g/dL    RDW 14.3 12.4 - 15.4 %    Platelets 175 577 - 059 K/uL    MPV 8.8 5.0 - 10.5 fL    Neutrophils % 63.7 %    Lymphocytes % 25.9 %    Monocytes % 7.1 %    Eosinophils % 2.3 %    Basophils % 1.0 %    Neutrophils Absolute 6.2 1.7 - 7.7 K/uL    Lymphocytes Absolute 2.5 1.0 - 5.1 K/uL    Monocytes Absolute 0.7 0.0 - 1.3 K/uL    Eosinophils Absolute 0.2 0.0 - 0.6 K/uL    Basophils Absolute 0.1 0.0 - 0.2 K/uL   CMP w/ Reflex to MG   Result Value Ref Range    Sodium 136 136 - 145 mmol/L    Potassium reflex Magnesium 4.6 3.5 - 5.1 mmol/L    Chloride 98 (L) 99 - 110 mmol/L    CO2 25 21 - 32 mmol/L    Anion Gap 13 3 - 16    Glucose 233 (H) 70 - 99 mg/dL    BUN 19 7 - 20 mg/dL    Creatinine 1.2 0.9 - 1.3 mg/dL    Est, Glom Filt Rate >60 >60    Calcium 9.5 8.3 - 10.6 mg/dL    Total Protein 7.8 6.4 - 8.2 g/dL    Albumin 4.2 3.4 - 5.0 g/dL    Albumin/Globulin Ratio 1.2 1.1 - 2.2    Total Bilirubin 0.4 0.0 - 1.0 mg/dL    Alkaline Phosphatase 99 40 - 129 U/L    ALT 28 10 - 40 U/L    AST 35 15 - 37 U/L   Lactate, Sepsis   Result Value Ref Range    Lactic Acid, Sepsis 1.7 0.4 - 1.9 mmol/L   Blood Gas, Venous   Result Value Ref Range    pH, Iker 7.438 7.350 - 7.450    pCO2, Iker 41.3 41.0 - 51.0 mmHg    pO2, Iker 70.3 (H) 25.0 - 40.0 mmHg    HCO3, Venous 27.9 24.0 - 28.0 mmol/L    Base Excess, Iker 3.3 (H) -2.0 - 3.0 mmol/L    O2 Sat, Iker 96 Not established %    Carboxyhemoglobin 4.6 (H) 0.0 - 1.5 %    MetHgb, Iker <0.0 0.0 - 1.5 %    TC02 (Calc), Iker 29 mmol/L    Hemoglobin, Iker, Reduced 4.30 %       ED BEDSIDE ULTRASOUND:  No results found. RECENT VITALS:  BP: (!) 133/112, Temp: 98.1 °F (36.7 °C), Heart Rate: 85, Resp: 16, SpO2: 98 %     Procedures     None    ED Course     Nursing Notes, Past Medical Hx,Past Surgical Hx, Social Hx, Allergies, and Family Hx were reviewed. The patient was given the following medications:  Orders Placed This Encounter   Medications    DISCONTD: cefTRIAXone (ROCEPHIN) 2,000 mg in sodium chloride 0.9 % 50 mL IVPB (mini-bag)     Order Specific Question:   Antimicrobial Indications     Answer:   Urinary Tract Infection    piperacillin-tazobactam (ZOSYN) 3,375 mg in sodium chloride 0.9 % 50 mL IVPB (mini-bag)     Order Specific Question:   Antimicrobial Indications     Answer:   Urinary Tract Infection    iopamidol (ISOVUE-370) 76 % injection 75 mL       CONSULTS:  Eugene Reinoso / OSEI / Lisa Galen is a 44 y.o. male with a history of type 2 diabetes, schizophrenia, hypertension, hyperlipidemia, history of multidrug-resistant urinary tract infections who presents to the emergency department with hematuria and flank pain. Please see HPI for more details. On my exam, the patient is somewhat tachycardic to 102 but he is hypertensive to 186/122. His abdomen is soft and nontender. He has right-sided CVA tenderness. Initial concern is for infected stone versus hemorrhagic cystitis versus pyelonephritis. Upon chart review the patient has a history of MDRO urinary tract infections (E coli) and the most recent urine culture is only susceptible to meropenem, Zosyn, gentamicin and amikacin. His urinalysis here today shows evidence of significant hematuria and urinary tract infection with greater than 100 whites, greater than 100 reds, nitrate positive, leukocyte esterase. This was sent for culture. The patient will be started on Zosyn given his susceptibilities in the past.  I also obtain blood cultures, CBC, CMP, VBG and lactate. I also will obtain a CT of the abdomen pelvis with IV contrast to further evaluate his flank pain and hematuria. Labs show no leukocytosis with a white blood cell count of 9.8. No acute anemia. Creatinine is 1.2, which is baseline. Electrolytes are normal.  Lactate is normal.  VBG shows no significant acid-base derangements. Blood and urine cultures are pending at this time. CT of the abdomen pelvis shows emphysematous cystitis, likely from his significant urinary tract infection. I talked to urology to make them aware of the patient's admission. They were in agreement to the plan and had no further recommendations at this time. The patient will be admitted for further antibiotics. Urology will see him in consultation in the morning. The patient was updated regarding the plan. He was in agreement. I spoke to the hospitalist who is in agreement as well. Patient will be admitted to the hospital for further management. He will be cared for in the emergency department until a bed can be obtained upstairs. Medical Decision Making  Problems Addressed:  Complicated UTI (urinary tract infection): acute illness or injury that poses a threat to life or bodily functions  Emphysematous cystitis: complicated acute illness or injury    Amount and/or Complexity of Data Reviewed  Labs: ordered. Decision-making details documented in ED Course. Radiology: ordered. Decision-making details documented in ED Course. Risk  Prescription drug management. Decision regarding hospitalization. This patient was also evaluated by the attending physician. All care plans were discussed and agreed upon. Clinical Impression     1. Complicated UTI (urinary tract infection)    2. Colonization with multidrug-resistant bacteria    3.  Emphysematous cystitis Disposition     PATIENT REFERRED TO:  No follow-up provider specified.     DISCHARGE MEDICATIONS:  New Prescriptions    No medications on file       DISPOSITION Decision To Admit 03/08/2023 12:44:40 AM        NOHEMI Ramirez  03/08/23 1528

## 2023-03-08 NOTE — H&P
Internal Medicine Note    Patient Name: Lori Esteves Date: 3/7/2023   Code:Full Code  PCP: Zoraida Fagan MD   Attending: Yonathan Astudillo MD    Chief Complaint: Hematuria (States he noticed some blood in his urine, states the urine was pink. Reports bilat flank but worse on the right side. )       Subjective   HPI:   Burton Rodriguez is a 44 y.o. male, with PMHx of  HTN, DMT2, cataract, schizophrenia, Drug addiction, Hep C with liver Cirrhosis, VRE, MRSA, MDRO, Pyelonephritis and HLD presents to the emergency department with right-sided flank pain, hematuria. .    Patient reported that around 3 PM today he had an episode of blood in the urine. Patient cannot differentiate if blood was mixed with the urine, or was at the start or an or urination. However patient reported that his complaint is associated with dysuria , frequency , urgency , suprapubic discomfort and ,right flank pain. Right flank pain is acute in onset, progressively getting worse, radiating towards the right groin, 7/10 in severity, exacerbated with movement but without any relieving associated with nausea. Patient reported that he never had similar complaint in the past, however he had some skin infections for which she was treated with antibiotics. On review of system patient denies any headaches, vision changes (other than cataract in the left eye), chest pain, chest tightness, palpitation, chills, fever, change in bowel habits. ROS:  As per HPI    ED Course:  Oriented x 4, Acute distress due to pain  Vitals: BP of  186/122, HR of 102, Temp of 98.1, SpO2 of 93%  Physical Exam: Right CVA tenderness  Labs: BMP wnl, hyperglycemia of 233, LFTs wnl, CBC wnl, UA: Blood, Nittrites, and estrase. WBC and RBC >100, VBG wnl,   Imaging: CT Abd: localized emphysematous cystitis in the posterior right bladder wall, with additional air in the urinary bladder anteriorly. retroperitoneal lymphadenopathy.    Treatment: Ceftriaxone and Zosyn    Summary of Clinical Encounters:    11/22/2020: Pyelonephritis due to E. coli MDRO, treated with doxycycline    10/04/2020: Diagnosed with MRSA bacteremia and bacteriuria  Past Medical Hx:      Diagnosis Date    Hearing loss     History of infection with vancomycin resistant Enterococcus (VRE)     HLD (hyperlipidemia)     HTN (hypertension)     Tinnitus     Uncontrolled hypertension 03/08/2023    Uncontrolled type 2 diabetes mellitus with hyperglycemia (HCC) 03/08/2023       Past Surgical Hx:  History reviewed. No pertinent surgical history.     Home Medication:  Prior to Admission medications    Medication Sig Start Date End Date Taking? Authorizing Provider   QUEtiapine (SEROQUEL) 400 MG tablet Take by mouth nightly 10/24/22  Yes Historical Provider, MD   dulaglutide (TRULICITY) 1.5 MG/0.5ML SC injection Inject into the skin every 7 days 1/19/23  Yes Historical Provider, MD   amLODIPine (NORVASC) 10 MG tablet Take by mouth daily  Patient not taking: Reported on 3/8/2023 5/7/21  Yes Historical Provider, MD   FLUoxetine (PROZAC) 20 MG capsule Take by mouth daily 10/24/22  Yes Historical Provider, MD   blood glucose test strips (TRUE METRIX BLOOD GLUCOSE TEST) strip Test 1 - 2 times a day as needed 1/19/23  Yes Historical Provider, MD   insulin aspart (NOVOLOG) 100 UNIT/ML injection pen Inject into the skin 3 times daily (before meals) 1/19/23  Yes Historical Provider, MD   atorvastatin (LIPITOR) 40 MG tablet TAKE 1 TABLET BY MOUTH EVERY DAY 1/19/23   Historical Provider, MD   lisinopril (PRINIVIL;ZESTRIL) 40 MG tablet TAKE 1 TABLET BY MOUTH EVERY DAY 1/19/23   Historical Provider, MD   metFORMIN (GLUCOPHAGE) 1000 MG tablet Take 1,000 mg by mouth 2 times daily (with meals)    Historical Provider, MD   insulin glargine (LANTUS) 100 UNIT/ML injection vial Inject 55 Units into the skin nightly    Historical Provider, MD   aspirin 81 MG tablet Take 81 mg by mouth daily    Historical Provider, MD  Allergies:  No Known Allergies    Social Hx:  Social History     Socioeconomic History    Marital status: Single     Spouse name: None    Number of children: None    Years of education: None    Highest education level: None   Tobacco Use    Smoking status: Former    Smokeless tobacco: Never   Vaping Use    Vaping Use: Never used   Substance and Sexual Activity    Alcohol use: Not Currently    Drug use: Yes     Comment: HEROIN-YESTERDAY        Family Hx:  History reviewed. No pertinent family history. Objective   Vital Signs:  Patient Vitals for the past 8 hrs:   BP Temp Temp src Pulse Resp SpO2 Height Weight   03/08/23 0500 -- -- -- -- -- -- -- 210 lb 12.2 oz (95.6 kg)   03/08/23 0419 (!) 154/96 -- -- 83 -- -- -- --   03/08/23 0221 -- -- -- -- -- -- 5' 11\" (1.803 m) 208 lb (94.3 kg)   03/08/23 0214 (!) 162/112 98.1 °F (36.7 °C) Oral 88 18 94 % -- --   03/08/23 0145 (!) 137/103 -- -- 87 16 98 % -- --   03/08/23 0035 -- -- -- 85 -- -- -- --   03/08/23 0000 (!) 133/112 -- -- 85 16 98 % -- --   03/07/23 2230 (!) 173/112 -- -- 98 16 97 % -- --   03/07/23 2200 (!) 170/108 -- -- (!) 102 16 97 % -- --   03/07/23 2145 (!) 177/117 -- -- (!) 102 18 -- -- --     Physical Exam  Constitutional:       Appearance: He is obese. He is ill-appearing. HENT:      Head: Normocephalic and atraumatic. Eyes:      Extraocular Movements: Extraocular movements intact. Pupils: Pupils are equal, round, and reactive to light. Cardiovascular:      Rate and Rhythm: Normal rate and regular rhythm. Pulses: Normal pulses. Heart sounds: Normal heart sounds. No murmur heard. No gallop. Pulmonary:      Effort: Pulmonary effort is normal. No respiratory distress. Breath sounds: Normal breath sounds. No wheezing or rales. Abdominal:      General: Abdomen is flat. Palpations: Abdomen is soft. Tenderness: There is abdominal tenderness. There is right CVA tenderness. There is no guarding or rebound. Musculoskeletal:      Cervical back: Normal range of motion and neck supple. No rigidity. Right lower leg: No edema. Left lower leg: No edema. Skin:     Coloration: Skin is not jaundiced. Findings: No lesion. Neurological:      General: No focal deficit present. Mental Status: He is alert and oriented to person, place, and time. Mental status is at baseline. Cranial Nerves: No cranial nerve deficit. Motor: No weakness. Psychiatric:         Mood and Affect: Mood normal.         Behavior: Behavior normal.      Labs:  CBC:   Recent Labs     03/07/23 2205   WBC 9.8   HGB 16.6   HCT 46.2          BMP:   Recent Labs     03/07/23 2205 03/08/23  0318    137   K 4.6 3.6   CL 98* 100   CO2 25 26   BUN 19 17   CREATININE 1.2 1.3   GLUCOSE 233* 218*    Magnesium: No results for input(s): MG in the last 72 hours. LFT's:   Recent Labs     03/07/23 2205   AST 35   ALT 28   BILITOT 0.4   ALKPHOS 99     INR: No results for input(s): INR in the last 72 hours. COVID-19: No results for input(s): COVID19 in the last 72 hours. Radiology:  CT ABDOMEN PELVIS W IV CONTRAST Additional Contrast? None   Final Result   1. Evidence of localized emphysematous cystitis in the posterior right bladder wall, with additional air in the urinary bladder anteriorly. 2. Normal echotexture with no hydronephrosis or pyelonephritis. Fetal lobulation is observed. 3. Hepatic steatosis   4. Splenomegaly and evidence of retroperitoneal lymphadenopathy.    5. Few scattered sigmoid diverticula, diverticulosis          Medications:  Current Facility-Administered Medications   Medication Dose Route Frequency Provider Last Rate Last Admin    amLODIPine (NORVASC) tablet 10 mg  10 mg Oral Daily Anselmo Bray MD        aspirin EC tablet 81 mg  81 mg Oral Daily Anselmo Bray MD        atorvastatin (LIPITOR) tablet 40 mg  40 mg Oral Daily Behram Melburn Helms, MD        lisinopril (PRINIVIL;ZESTRIL) tablet 40 mg  40 mg Oral Daily Radha Pretty MD        glucose chewable tablet 16 g  4 tablet Oral PRN Radha Pretty MD        dextrose bolus 10% 125 mL  125 mL IntraVENous PRN Radha Pretty MD        Or    dextrose bolus 10% 250 mL  250 mL IntraVENous PRN Radha Pretty MD        glucagon injection 1 mg  1 mg SubCUTAneous PRN Radha Pretty MD        dextrose 10 % infusion   IntraVENous Continuous PRN Radha Pretty MD        insulin glargine (LANTUS;BASAGLAR) injection pen 35 Units  35 Units SubCUTAneous Nightly Behram Ahmed Darnelle Life, MD        insulin lispro (1 Unit Dial) (HUMALOG/ADMELOG) pen 0-4 Units  0-4 Units SubCUTAneous TID  Behram Windell Cleaves, MD        insulin lispro (1 Unit Dial) (HUMALOG/ADMELOG) pen 0-4 Units  0-4 Units SubCUTAneous Nightly Behram Ahmed Darnelle Life, MD        sodium chloride flush 0.9 % injection 5-40 mL  5-40 mL IntraVENous 2 times per day Radha Pretty MD        sodium chloride flush 0.9 % injection 5-40 mL  5-40 mL IntraVENous PRN Radha Pretty MD        0.9 % sodium chloride infusion   IntraVENous PRN Radha Pretty MD        [facility] [place] by provider] enoxaparin (LOVENOX) injection 40 mg  40 mg SubCUTAneous Daily Behram Windell Cleaves, MD        ondansetron (ZOFRAN-ODT) disintegrating tablet 4 mg  4 mg Oral Q8H PRN Radha Pretty MD        Or    [facility] injection 4 mg  4 mg IntraVENous Q6H PRN Radha Pretty MD        acetaminophen (TYLENOL) tablet 650 mg  650 mg Oral Q6H PRN Radha Pretty MD        Or    acetaminophen (TYLENOL) suppository 650 mg  650 mg Rectal Q6H PRN Radha Pretty MD        polyethylene glycol (GLYCOLAX) packet 17 g  17 g Oral Daily PRN Radha Pretty MD        meropenem (MERREM) 1,000 mg in sodium chloride 0.9 % 100 mL IVPB (mini-bag)  1,000 mg IntraVENous Q8H Behram Windell Cleaves, MD        QUEtiapine (SEROQUEL) tablet 400 mg  400 mg Oral Nightly Nato Baker DO                      [address] [facility] Denise Chandler is a 44 y.o. male, with PMHx of HTN, DMT2, cataract, schizophrenia, Drug addiction, VRE, MRSA, MDRO, Pyelonephritis and HLD presents to the emergency department with right-sided flank pain, hematuria. Emphysematous cystitis - complicated UTI  Presented with a complaint of hematuria, had CVA and suprapubic tenderness on examination. UA remarkable for nitrites and esterase  Has a history of VRE and E. coli MDRO (urine culture 11/22/2020)  -Start meropenem  -Start Vanc  -Urine cultures with reflex  -Post vital residual volume every 6 hours  -Strict I's and O's  -ID consult for optimization of antibiotics  -Consider consulting urology    Uncontrolled diabetes type 2  HBA1c 12.9 (12/20/2022)  On dulaglutide, insulin aspart pre-meal, and insulin Lantus 55 units nightly  -POCT glucose  -Hypoglycemia protocol  -Insulin Lantus 35 units nightly  -Medium-dose sliding scale  -Hold metformin and dulaglutide      Chronic Medical Conditions  Mood disorders: On Seroquel 400 mg nightly  -Continue home medication    Hypertension:  On amlodipine 10 mg and lisinopril 40 mg  -Continue amlodipine and lisinopril      Hyperlipidemia: On atorvastatin 40 mg  -Continue home medication    Cataract: Seeing ophthalmologist  -Continue to monitor      DVT PPx: SCDs, hold anticoagulation secondary to hematuria  Diet: Diet NPO   Code status:  Full Code   ELOS: 2 days  Barriers to discharge: Evaluation for hematuria  Disposition  - Preadmission: Home  - Current: Medical floor  - Upon discharge: Home    Will discuss with attending physician Randell Min MD  ________________________  Jose Maria Hsu MD,   PGY-1, Internal Medicine  03/08/23  5:04 AM

## 2023-03-08 NOTE — CARE COORDINATION
CM spoke with patient at bedside. Patient is from home with s/o, independent pta, drives self. CM explained that if he needed long term IV abx, would be back to discuss options. Patient states s/o would transport him home.     Andrade Plascencia RN, BSN,    Ortho/Neuro   [phone]

## 2023-03-08 NOTE — PLAN OF CARE
Problem: Discharge Planning  Goal: Discharge to home or other facility with appropriate resources  Outcome: Progressing  Plan of care reviewed with pt. All questions answered at this time.      Problem: Safety - Adult  Goal: Free from fall injury  Outcome: Progressing  Fall precautions are in place: call light in reach, bed is locked and in lowest position, bed alarm engaged. Remains free of falls.

## 2023-03-08 NOTE — CONSULTS
Clinical Pharmacy Progress Note    Vancomycin has been discontinued - Pharmacy will sign off on dosing  If resumed, please re-consult Pharmacy     Please call with questions:  [phone] ([facility])    Benita ZELAYA    3/8/2023 9:25 AM

## 2023-03-08 NOTE — PROGRESS NOTES
Pt arrived to [address] [place]. Oriented to room, use of call light, and fall precautions. Pt verbalizes understanding. BP elevated, MD notified.

## 2023-03-08 NOTE — PLAN OF CARE
Pain  Goal: Verbalizes/displays adequate comfort level or baseline comfort level  Outcome: Progressing     Safety - Adult  Goal: Free from fall injury  Outcome: Progressing

## 2023-03-08 NOTE — ED NOTES
ED TO INPATIENT SBAR HANDOFF    Patient Name: Karli Mireles   :  1980  44 y.o. MRN:  [id number]  Preferred Name  Guanako Cota  ED Room #:  A03/A03-03  Family/Caregiver Present no   Restraints no   Sitter no   Sepsis Risk Score Sepsis Risk Score: 0.57    Situation  Code Status: Prior No additional code details. Allergies: Patient has no known allergies. Weight: Patient Vitals for the past 96 hrs (Last 3 readings):   Weight   23 2044 208 lb (94.3 kg)     Arrived from: home  Chief Complaint:   Chief Complaint   Patient presents with    Hematuria     States he noticed some blood in his urine, states the urine was pink. Reports bilat flank but worse on the right side. Hospital Problem/Diagnosis:  Active Problems:    * No active hospital problems. *  Resolved Problems:    * No resolved hospital problems. *    Imaging:   CT ABDOMEN PELVIS W IV CONTRAST Additional Contrast? None   Final Result   1. Evidence of localized emphysematous cystitis in the posterior right bladder wall, with additional air in the urinary bladder anteriorly. 2. Normal echotexture with no hydronephrosis or pyelonephritis. Fetal lobulation is observed. 3. Hepatic steatosis   4. Splenomegaly and evidence of retroperitoneal lymphadenopathy.    5. Few scattered sigmoid diverticula, diverticulosis        Abnormal labs:   Abnormal Labs Reviewed   URINALYSIS WITH REFLEX TO CULTURE - Abnormal; Notable for the following components:       Result Value    Glucose, Ur >=1000 (*)     Blood, Urine LARGE (*)     Protein,  (*)     Nitrite, Urine POSITIVE (*)     Leukocyte Esterase, Urine SMALL (*)     All other components within normal limits   MICROSCOPIC URINALYSIS - Abnormal; Notable for the following components:    WBC, UA >100 (*)     RBC, UA >100 (*)     Bacteria, UA 3+ (*)     All other components within normal limits   COMPREHENSIVE METABOLIC PANEL W/ REFLEX TO MG FOR LOW K - Abnormal; Notable for the following components: Chloride 98 (*)     Glucose 233 (*)     All other components within normal limits   BLOOD GAS, VENOUS - Abnormal; Notable for the following components:    pO2, Iker 70.3 (*)     Base Excess, Iker 3.3 (*)     Carboxyhemoglobin 4.6 (*)     All other components within normal limits     Critical values: yes     Abnormal Assessment Findings: None    Background  History:   Past Medical History:   Diagnosis Date    Hearing loss     History of infection with vancomycin resistant Enterococcus (VRE)     HLD (hyperlipidemia)     HTN (hypertension)     Tinnitus     Uncontrolled type 2 diabetes mellitus with hyperglycemia (HCC)        Assessment    Vitals/MEWS: MEWS Score: 2  Level of Consciousness: Alert (0)   Vitals:    03/07/23 2145 03/07/23 2200 03/07/23 2230 03/08/23 0000   BP: (!) 177/117 (!) 170/108 (!) 173/112 (!) 133/112   Pulse: (!) 102 (!) 102 98 85   Resp: 18 16 16 16   Temp:       TempSrc:       SpO2:  97% 97% 98%   Weight:         FiO2 (%): None  O2 Flow Rate: O2 Device: None (Room air)    Cardiac Rhythm:    Pain Assessment: 6/10 [x] Verbal [] Verla Jennifer Scale  Pain Scale: Pain Assessment  Pain Assessment: 0-10  Pain Level: 6  Patient's Stated Pain Goal: 0 - No pain  Pain Location: Flank  Pain Orientation: Right  Last documented pain score (0-10 scale) Pain Level: 6  Last documented pain medication administered: None  Mental Status: alert  Orientation Level:    NIH Score:    C-SSRS: Risk of Suicide: No Risk  Bedside swallow:    Guttenberg Coma Scale (GCS): Guttenberg Coma Scale  Eye Opening: Spontaneous  Best Verbal Response: Oriented  Best Motor Response: Obeys commands  Thomas Coma Scale Score: 15  Active LDA's:   Peripheral IV 03/07/23 Left Wrist (Active)     PO Status: Regular  Pertinent or High Risk Medications/Drips: no   o If Yes, please provide details: None  Pending Blood Product Administration: no       You may also review the ED PT Care Timeline found under the Summary Nursing Index tab.    Recommendation    Pending orders None  Plan for Discharge (if known):    Additional Comments: Able to ambulated w/o assist   If any further questions, please call Sending RN at [phone]    Electronically signed by: Electronically signed by Xi Hopkins RN on 3/8/2023 at 1:17 AM     Xi Hopkins RN  03/08/23 3368

## 2023-03-08 NOTE — CONSULTS
Clinical Pharmacy Progress Note    Vancomycin  - Management by Pharmacy    Consult Date(s): 03/08/23  Consulting Provider(s): Dr. Greg Silverio / Plan  Urinary Tract Infection - Vancomycin  Concurrent Antimicrobials:   Meropenem (Day 1/10)  Day of Vanc Therapy / Ordered Duration: Day 1/7  Current Dosing Method: Bayesian-Guided AUC Dosing  Therapeutic Goal: -600 mg/L*hr  Current Dose / Plan:   Patient appears to have stable renal function with SCr 1.3 this AM.  Patient received a LD of 2000 mg (~21 mg/kg) IV vancomycin x1, followed by vancomycin 1000 mg IV every 12 hours. Kinetics estimate  mg/L.hr and trough 16.6 mg/L. Will continue current regimen for now. Will check a level in ~48 hours or sooner if clinically indicated to assess kinetics. Will continue to monitor clinical condition and make adjustments to regimen as appropriate. Thank you for consulting pharmacy,  Rakesh Masseypool, PharmD  PGY-1 Pharmacy Resident  [facility]OM[facility]  [id number]  3/8/2023   8:55 AM        Interval update: This morning patient is afebrile and hypertensive. ID is following. Subjective/Objective:   Rosaura Arnold is a 44 y.o. male with a PMHx significant for HTN, T2DM, cataracts, schizophrenia, hepatitis C with liver cirrhosis, HLD, and pyelonephritis who is admitted with emphysematous cystitis. Patient has a history of growing MRSA, VRE, and MDRO organisms. Pharmacy is consulted to dose vancomycin .     Ht Readings from Last 1 Encounters:   03/08/23 5' 11\" (1.803 m)     Wt Readings from Last 1 Encounters:   03/08/23 210 lb 12.2 oz (95.6 kg)     Current & Prior Antimicrobial Regimen(s):  Zosyn IV EI x1 (3/7)  Meropenem IV EI (3/8-Current)  Vancomycin PTD  2000 mg IV x1 (3/8)  1000 mg IV every 12 hours (3/8-Current)    Vancomycin Level(s) / Doses:    Date Time Dose Type of Level / Level Interpretation                 Note: Serum levels collected for AUC-based dosing may be high if collected in close proximity to the dose administered. This is not necessarily indicative of toxicity. Cultures & Sensitivities:    Date Site Micro Susceptibility / Result   3/7 Urine Cx In process    3/7 Blood Cx 2/2 In process    3/8 Blood Cx x1 Ordered      Recent Labs     03/07/23  2205 03/08/23  0318   CREATININE 1.2 1.3   BUN 19 17   WBC 9.8  --        Estimated Creatinine Clearance: 87 mL/min (based on SCr of 1.3 mg/dL). Additional Lab Values / Findings of Note:    No results for input(s): PROCAL in the last 72 hours.

## 2023-03-08 NOTE — PROGRESS NOTES
PT has been resting throughout the shift. Mr. Arline Peabody did not report any pain. He is voiding adequately and retaining minimal amount of urine. IV antibiotics are infusing.

## 2023-03-08 NOTE — PROGRESS NOTES
Clinical Pharmacy Progress Note    Vancomycin - Management by Pharmacy    Consult Date(s): 03/08/23  Consulting Provider(s): Augustin Mac    Assessment / Plan  UTI - Vancomycin  Concurrent Antimicrobials: Meropenem  Day of Vanc Therapy / Ordered Duration: 1 / 7  Current Dosing Method: Bayesian-Guided AUC Dosing  Therapeutic Goal: AUC < 500 mg/L*hr  Current Dose / Plan:   Patient with stable renal function (Scr 1.3 mg/dL). Will give a LD of 2000 mg IV x Once followed by 1000 mg IV q12 hours. This dose predicts an AUC of 489 mg/L*hr and ssTrough of 16 mg/L. Will continue to monitor clinical condition and make adjustments to regimen as appropriate.     Thank you for consulting pharmacy,    Han Tavares, PharmD  Main Pharmacy: [id number]  3/8/2023 6:47 AM

## 2023-03-08 NOTE — ED PROVIDER NOTES
ED Attending Attestation Note     Date of evaluation: 3/7/2023    This patient was seen by the advance practice provider. I have seen and examined the patient, agree with the workup, evaluation, management and diagnosis. The care plan has been discussed. My assessment reveals complaints of blood in his urine. Patient reports noticing blood in his urine earlier today. Patient is a difficult historian secondary to history of schizophrenia. Patient does have a history of urinary tract infections in the past in review of his records but cannot tell me if this feels similar. He does note pain to the right flank. He denies any fevers, chills, nausea, or vomiting. He states his blood sugars have been running in the 200s. On arrival, patient is hemodynamically stable. He has a soft abdomen on exam with right CVA tenderness. We will check laboratory studies, imaging.        Ruma Pearce MD  03/08/23 2434

## 2023-03-08 NOTE — PROGRESS NOTES
Pharmacy Note - Extended Infusion Beta-Lactam Adjustment    Meropenem ordered for treatment of UTI. Per [facility] Extended Infusion Beta-Lactam Policy, Meropenem will be changed to a LD of 1g followed by 1g q8hr EI. Estimated Creatinine Clearance: Estimated Creatinine Clearance: 94 mL/min (based on SCr of 1.2 mg/dL). Dialysis Status, HUGO, CKD: None  BMI: Body mass index is 29.01 kg/m². Rationale for Adjustment: Agent is renally eliminated and demonstrates time-dependent effect on bacterial eradication. Extended-infusion dosing strategy aims to enhance microbiologic and clinical efficacy. Pharmacy will continue to monitor renal function, cultures and sensitivities (where available) and adjust dose as necessary. Please call with any questions.     Divine Medina, PharmD  Main Pharmacy: [id number]  3/8/2023 3:33 AM

## 2023-03-08 NOTE — PROGRESS NOTES
Progress Note    Admit Date: 3/7/2023  Day: 3/27/23  Diet: ADULT DIET; Regular    CC: I was peeing blood    Interval history:  Patient was seen at bed side, stable oriented x 4, denies any current pain or discomfort. No acute overnight events reported. HPI:   44year old male with past medical history of schizophrenia, Uncontrolled DM type 2, Hepatitis C with cirrhosis, Insterstitial cystitis, pyelonephritis, MRSA  and ESBL bacteremia 2/2 retention ,HLD, presenting to the ED on 3/7/23 with right sided flank pain and hematuria. Patient states that he first noticed  blood in urine yesterday around 3 PM, and had 2 more episodes of hematuria before presenting to the ED around 8 PM after the mother of his  child  advised him to go to the emergency room. He reports the hematuria was associated with progressive right sided flank pain that started yesterday as well and radiated towards groin. The pain was rated 6/10 before admission. He endorses increased frequency and urgency with urination 1 day prior to the hematuria, but denies any burning sensation around that time. Patient states he is not currently sexually active and has not been for the past 4-5 years. He denies any prior episode of hematuria, or recent UTI. He denies any fever, lightheadedness,nausea, chills around the episode of hematuria but endorses night sweats for 2 days prior to the episode. He denies any recent flu-like symptoms,changes in vision, but endorses intermittent headaches. He denies any vomiting or diarrhea, abdominal or chest   pain. He is former smoker an IVDU, denies current use. Ed Course  Oriented x 4, acute distress due to painVitals: /122, , Temp 98.1, SpO2 93%  PE: Right CVA tenderness   Labs: BMP wnL, LFTs wnl, Hyperglycemia 233, UA; blood, nitrites, positive leucocyte esterase.   Imaging: CT Abd shows emphysematous cystitis in posterior right bladder wall, with additional air in the urinary bladder anteriorly. Abx: ceftriaxone, Zosyn    Relevant  clinical encounters    11/20/2020: Pyelonephritis due to E. Coli MDRO, treated with doxycycline  12/20 /2020: UTI, Bacterial  10/04/2020: MRSA bacteremia and bacteriuria. Medications:     Scheduled Meds:   amLODIPine  10 mg Oral Daily    aspirin  81 mg Oral Daily    atorvastatin  40 mg Oral Daily    lisinopril  40 mg Oral Daily    insulin glargine  35 Units SubCUTAneous Nightly    sodium chloride flush  5-40 mL IntraVENous 2 times per day    meropenem  1,000 mg IntraVENous Q8H    QUEtiapine  400 mg Oral Nightly    insulin lispro  0-8 Units SubCUTAneous TID WC    insulin lispro  0-4 Units SubCUTAneous Nightly     Continuous Infusions:   dextrose      sodium chloride 50 mL (03/08/23 0751)     PRN Meds:glucose, dextrose bolus **OR** dextrose bolus, glucagon (rDNA), dextrose, sodium chloride flush, sodium chloride, ondansetron **OR** ondansetron, acetaminophen **OR** acetaminophen, polyethylene glycol    Objective:   Vitals:   T-max:  Patient Vitals for the past 8 hrs:   BP Temp Temp src Pulse Resp SpO2 Height Weight   03/08/23 0649 (!) 145/96 98.6 °F (37 °C) Oral 65 18 96 % -- --   03/08/23 0500 -- -- -- -- -- -- -- 210 lb 12.2 oz (95.6 kg)   03/08/23 0419 (!) 154/96 -- -- 83 -- -- -- --   03/08/23 0221 -- -- -- -- -- -- 5' 11\" (1.803 m) 208 lb (94.3 kg)       Intake/Output Summary (Last 24 hours) at 3/8/2023 1017  Last data filed at 3/8/2023 0908  Gross per 24 hour   Intake 99.92 ml   Output 800 ml   Net -700.08 ml       Review of Systems   Constitutional:  Positive for diaphoresis. Negative for chills and fever. HENT:  Negative for congestion. Respiratory:  Negative for shortness of breath. Cardiovascular:  Negative for chest pain and leg swelling. Gastrointestinal:  Negative for diarrhea, nausea and vomiting. Endocrine: Positive for polyuria. Negative for polydipsia. Genitourinary:  Positive for flank pain (right sided) and hematuria. Negative for genital sores and penile discharge. Musculoskeletal:  Positive for back pain. Negative for myalgias. Skin:  Negative for rash. Neurological:  Negative for dizziness and headaches. Physical Exam  Constitutional:       Appearance: He is not ill-appearing. HENT:      Head: Normocephalic and atraumatic. Cardiovascular:      Rate and Rhythm: Normal rate and regular rhythm. Abdominal:      General: Bowel sounds are normal. There is no distension or abdominal bruit. Palpations: Abdomen is soft. Tenderness: There is abdominal tenderness in the suprapubic area. There is no guarding. Genitourinary:     Penis: Normal. No discharge, swelling or lesions. Neurological:      Mental Status: He is alert. LABS:    CBC:   Recent Labs     03/07/23 2205   WBC 9.8   HGB 16.6   HCT 46.2      MCV 86.7     Renal:    Recent Labs     03/07/23 2205 03/08/23  0318    137   K 4.6 3.6   CL 98* 100   CO2 25 26   BUN 19 17   CREATININE 1.2 1.3   GLUCOSE 233* 218*   CALCIUM 9.5 9.3   ANIONGAP 13 11     Hepatic:   Recent Labs     03/07/23  2205   AST 35   ALT 28   BILITOT 0.4   PROT 7.8   LABALBU 4.2   ALKPHOS 99     Troponin: No results for input(s): TROPONINI in the last 72 hours. BNP: No results for input(s): BNP in the last 72 hours. Lipids: No results for input(s): CHOL, HDL in the last 72 hours. Invalid input(s): LDLCALCU, TRIGLYCERIDE  ABGs:  No results for input(s): PHART, QDZ7YRI, PO2ART, HEJ5GKJ, BEART, THGBART, L8UQMBZD, TAM8QQQ in the last 72 hours. INR: No results for input(s): INR in the last 72 hours. Lactate: No results for input(s): LACTATE in the last 72 hours. Cultures:  -----------------------------------------------------------------  RAD:   CT ABDOMEN PELVIS W IV CONTRAST Additional Contrast? None   Final Result   1. Evidence of localized emphysematous cystitis in the posterior right bladder wall, with additional air in the urinary bladder anteriorly. 2. Normal echotexture with no hydronephrosis or pyelonephritis. Fetal lobulation is observed. 3. Hepatic steatosis   4. Splenomegaly and evidence of retroperitoneal lymphadenopathy. 5. Few scattered sigmoid diverticula, diverticulosis          Assessment/Plan:   44year old male with past medical history of schizophrenia, Uncontrolled DM type 2, Hepatitis C with cirrhosis, Insterstitial cystitis, pyelonephritis, MRSA  and ESBL bacteremia 2/2 urinary retention ,HLD, presenting to the ED on 3/7/23 with right sided flank pain and hematuria. Complicated UTI  with Emphysematous cystitis  Hematuria and right flank pain at presentation. CT abdomen: localized emphysematous cystitis in the posterior right bladder wall with additional air in the urinary bladder anteriorly. - Previous history of Pyelonephritis with   -Urine CX pending  -Continue Meropenem IVPB 1000 mg  and Zosyn 3375 mg  - Monitor I/O and KFTs    Uncontrolled diabetes mellitus  - Previously recorded non- compliance with home med  and diabetic mononeuropathy on 07/07/21  - Hyperglycemia of 190. Last HbA1C was 12.8  on 01/19/23  - Repeat A1C  - Currently on Lantus, 35 units nightly and lispro 0-8 TID and nightly. Uncontolled HTN  - BP on admission 186/122, no evidence of end-organ damage  - SBP consistently > 145 since admission. Current reading is 145/96  - Currently on Lisinopril 40 mg and amlodipine 10 mg daily  - Monitor  for BP changes    Chronic medical conditions  Chronic Hepatitis C, compensated  - LFTs wnl, continue to monitor.  - Currently on Levemir 100 units Q HS    Schizophrenia  - Continue Seroquel 400 mg.      Code Status: DNR-CCA  FEN: NPO  PPX: SCDs  DISPO: DON    Racheljenelle Uriartejosé luis, MS3   03/08/23  10:17 AM    This patient has been staffed and discussed with Jac Brooks MD.

## 2023-03-08 NOTE — PROGRESS NOTES
4 Eyes Admission Assessment     I agree as the admission nurse that 2 RN's have performed a thorough Head to Toe Skin Assessment on the patient. ALL assessment sites listed below have been assessed on admission. Areas assessed by both nurses: yes  [x]   Head, Face, and Ears   [x]   Shoulders, Back, and Chest  [x]   Arms, Elbows, and Hands   [x]   Coccyx, Sacrum, and Ischium  [x]   Legs, Feet, and Heels        Does the Patient have Skin Breakdown? No         Jean Pierre Prevention initiated:  NA   Wound Care Orders initiated:  NA      [facility] nurse consulted for Pressure Injury (Stage 3,4, Unstageable, DTI, NWPT, and Complex wounds) or Jean Pierre score 18 or lower:  No      Nurse 1 eSignature: Electronically signed by Jair Colon.  Jeremías Rajput on 3/8/23 at 4:29 AM EST    **SHARE this note so that the co-signing nurse is able to place an eSignature**    Nurse 2 eSignature: Electronically signed by Dickson Rajan RN on 3/8/23 at 4:35 AM EST'

## 2023-03-08 NOTE — CONSULTS
Urology Attending Consult Note      Reason for Consultation: Emphysematous cystitis    History: 45 yo M with no prior  history admitted with emphysematous cystitis. He noticed hematuria yesterday and R flank pain and sought ER care. CT scan revealed area of emphysematous cystitis within the bladder. Ua grossly positive, culture pending. On Merrem. He now reports that urine is clear and he is asymptomatic. Family History, Social History, Review of Systems:  Reviewed and agreed to as per chart    Vitals:  BP (!) 145/96   Pulse 65   Temp 98.6 °F (37 °C) (Oral)   Resp 18   Ht 5' 11\" (1.803 m)   Wt 210 lb 12.2 oz (95.6 kg)   SpO2 96%   BMI 29.40 kg/m²   Temp  Av.3 °F (36.8 °C)  Min: 98.1 °F (36.7 °C)  Max: 98.6 °F (37 °C)    Intake/Output Summary (Last 24 hours) at 3/8/2023 0950  Last data filed at 3/8/2023 0908  Gross per 24 hour   Intake 99.92 ml   Output 800 ml   Net -700.08 ml         Physical:  Well developed, well nourished in no acute distress  Mood indicates no abnormalities. Pt doesnt appear depressed  Orientated to time and place  Neck is supple, trachea is midline  Respiratory effort is normal  Cardiovascular show no extremity swelling  Abdomen no masses or hernias are palpated, there is no tenderness. Liver and Spleen appear normal.  Skin show no abnormal lesions  Lymph nodes are not palpated in the inguinal, neck, or axillary area.      Male :  Urine clear      Labs:  WBC:    Lab Results   Component Value Date/Time    WBC 9.8 2023 10:05 PM     Hemoglobin/Hematocrit:    Lab Results   Component Value Date/Time    HGB 16.6 2023 10:05 PM    HCT 46.2 2023 10:05 PM     BMP:    Lab Results   Component Value Date/Time     2023 03:18 AM    K 3.6 2023 03:18 AM     2023 03:18 AM    CO2 26 2023 03:18 AM    BUN 17 2023 03:18 AM    LABALBU 4.2 2023 10:05 PM    CREATININE 1.3 2023 03:18 AM    CALCIUM 9.3 2023 03:18 AM GFRAA >60 03/07/2020 05:38 AM    LABGLOM >60 03/08/2023 03:18 AM     PT/INR:    Lab Results   Component Value Date/Time    PROTIME 14.9 03/06/2020 05:34 AM    INR 1.28 03/06/2020 05:34 AM     PTT:  No results found for: APTT[APTT    Urinalysis: Positive    Urine Culture: Pending    Impression   1. Evidence of localized emphysematous cystitis in the posterior right bladder wall, with additional air in the urinary bladder anteriorly. 2. Normal echotexture with no hydronephrosis or pyelonephritis. Fetal lobulation is observed. 3. Hepatic steatosis   4. Splenomegaly and evidence of retroperitoneal lymphadenopathy.    5. Few scattered sigmoid diverticula, diverticulosis       Impression/Plan: 43 yo M admitted with emphysematous cystitis.     -No  complaints, urine clear  -Ucx pending, continue IV abx  -I recommended placing indwelling catheter but patient is refusing at this time  -Please call with any further questions, will see NOHEMI Cortés

## 2023-03-08 NOTE — PROGRESS NOTES
Internal Medicine Progress Note    Date: 3/8/2023   Patient: [facility] Day: 0      CC: Hematuria (States he noticed some blood in his urine, states the urine was pink. Reports bilat flank but worse on the right side. )       Interval Hx   Patient seen at bedside this morning. No overnight event reported. Reports 1 episode of painless gross hematuria. Has not had another episode since onset. HE denies any prior episodes in the past. He denies any flank pain, suprapubic pain. He reported frequency which has not changed from his baseline, reports working up to use the bathroom multiples, unable to tell how many times. He denies any recent trauma. He reports he does not work and has not worked in the past. He denies any recent weight loss, fever, chills or loss of appetite. Objective     Vital Signs:  Patient Vitals for the past 8 hrs:   BP Temp Temp src Pulse Resp SpO2 Height Weight   03/08/23 0500 -- -- -- -- -- -- -- 210 lb 12.2 oz (95.6 kg)   03/08/23 0419 (!) 154/96 -- -- 83 -- -- -- --   03/08/23 0221 -- -- -- -- -- -- 5' 11\" (1.803 m) 208 lb (94.3 kg)   03/08/23 0214 (!) 162/112 98.1 °F (36.7 °C) Oral 88 18 94 % -- --   03/08/23 0145 (!) 137/103 -- -- 87 16 98 % -- --   03/08/23 0035 -- -- -- 85 -- -- -- --   03/08/23 0000 (!) 133/112 -- -- 85 16 98 % -- --   03/07/23 2230 (!) 173/112 -- -- 98 16 97 % -- --       Physical Exam  Constitutional:       Appearance: He is obese. HENT:      Nose: Nose normal.      Mouth/Throat:      Mouth: Mucous membranes are moist.   Cardiovascular:      Rate and Rhythm: Normal rate. Pulses: Normal pulses. Heart sounds: Normal heart sounds. Pulmonary:      Effort: Pulmonary effort is normal. No respiratory distress. Breath sounds: Normal breath sounds. No wheezing. Abdominal:      General: Bowel sounds are normal. There is no distension. Palpations: Abdomen is soft. Tenderness: There is no abdominal tenderness.  There is no right CVA tenderness, left CVA tenderness or guarding. Musculoskeletal:      Right lower leg: No edema. Left lower leg: No edema. Skin:     General: Skin is warm and dry. Capillary Refill: Capillary refill takes less than 2 seconds. Neurological:      Mental Status: He is alert and oriented to person, place, and time. Psychiatric:         Mood and Affect: Mood normal.         Behavior: Behavior normal.         Thought Content: Thought content normal.          Labs:  CBC:   Recent Labs     03/07/23 2205   WBC 9.8   HGB 16.6   HCT 46.2          BMP:   Recent Labs     03/07/23 2205 03/08/23  0318    137   K 4.6 3.6   CL 98* 100   CO2 25 26   BUN 19 17   CREATININE 1.2 1.3   GLUCOSE 233* 218*     Magnesium: No results for input(s): MG in the last 72 hours. LFT's:   Recent Labs     03/07/23 2205   AST 35   ALT 28   BILITOT 0.4   ALKPHOS 99         U/A:   Recent Labs     03/07/23  2114   COLORU Yellow   PHUR 6.0   WBCUA >100*   RBCUA >100*   BACTERIA 3+*   CLARITYU Clear   SPECGRAV 1.025   LEUKOCYTESUR SMALL*   UROBILINOGEN 0.2   BILIRUBINUR Negative   BLOODU LARGE*   GLUCOSEU >=1000*       Radiology:  CT ABDOMEN PELVIS W IV CONTRAST Additional Contrast? None   Final Result   1. Evidence of localized emphysematous cystitis in the posterior right bladder wall, with additional air in the urinary bladder anteriorly. 2. Normal echotexture with no hydronephrosis or pyelonephritis. Fetal lobulation is observed. 3. Hepatic steatosis   4. Splenomegaly and evidence of retroperitoneal lymphadenopathy.    5. Few scattered sigmoid diverticula, diverticulosis            Assessment & Plan     Complicated UTI  Emphysematous cystitis  Painless gross hematuria   Presented with painless gross hematuria, reported right sided flank pain, afebrile  -UA with large blood, positive nitrite and small leukocyte esterase  -CT Abd/pelvis: Emphysematous cystitis in the posterior right bladder wall with additional air in the bladder anteriorly. No hydronephrosis id pyelonephritis  Urine culture 11/22/2020 E. coli MDR, sensitive to meropenem, urine cx 10/28/2020 ESBL E.coli    Poorly controlled DM 2  Has a history of reported poor compliance; has recently been assigned a home health care aide 1/23 to help with medication currently. He also has difficulty affording some of his meds like Trulicity.  -Hemoglobin A1c 12/20/22 12.8  -Takes Levemir 55 units nightly, NovoLog 25 units 3 times daily, metformin 1 g twice daily, Trulicity currently increased from 0.75 qweekly to 1.5 (1/19/23)  -Noncompliance with Jardiance 25 mg daily due to reported dizziness stroke lightheadedness  -MDSSI/POC glucose/Lantus 35 units nightly  -Carb controlled diet   -Repeat Hb A1c     Chronic medical conditions  Schizoaffective disorder, bipolar type  -Continue home Seroquel 400 mg nightly and 100 mg as needed  -Prozac 20 mg qhs    Cataracts  Follows with [address] jos[address] luis    Hypertension  -prior reported poor compliance with lisinopril daily  -On lisinopril 40 mg daily and Norvasc 10 mg daily at home    DVT PPx: SCDs  Diet: Diet NPO   Code status:  DNR-CCA     ELOS:  Barriers to discharge:  Disposition  - Preadmission:  - Current:  - Upon discharge:     Will discuss with attending physician   _____________________  Liliane Simpson MD   Internal Medicine Resident, PGY-1

## 2023-03-09 LAB
ANION GAP SERPL CALCULATED.3IONS-SCNC: 10 MMOL/L (ref 3–16)
BASOPHILS ABSOLUTE: 0 K/UL (ref 0–0.2)
BASOPHILS RELATIVE PERCENT: 0.2 %
BUN BLDV-MCNC: 14 MG/DL (ref 7–20)
CALCIUM SERPL-MCNC: 8.5 MG/DL (ref 8.3–10.6)
CHLORIDE BLD-SCNC: 104 MMOL/L (ref 99–110)
CO2: 26 MMOL/L (ref 21–32)
CREAT SERPL-MCNC: 1.1 MG/DL (ref 0.9–1.3)
EKG ATRIAL RATE: 81 BPM
EKG DIAGNOSIS: NORMAL
EKG P AXIS: 28 DEGREES
EKG P-R INTERVAL: 132 MS
EKG Q-T INTERVAL: 410 MS
EKG QRS DURATION: 90 MS
EKG QTC CALCULATION (BAZETT): 476 MS
EKG R AXIS: 34 DEGREES
EKG T AXIS: 63 DEGREES
EKG VENTRICULAR RATE: 81 BPM
EOSINOPHILS ABSOLUTE: 0.2 K/UL (ref 0–0.6)
EOSINOPHILS RELATIVE PERCENT: 2.7 %
ESTIMATED AVERAGE GLUCOSE: 185.8 MG/DL
GFR SERPL CREATININE-BSD FRML MDRD: >60 ML/MIN/{1.73_M2}
GLUCOSE BLD-MCNC: 169 MG/DL (ref 70–99)
GLUCOSE BLD-MCNC: 170 MG/DL (ref 70–99)
GLUCOSE BLD-MCNC: 182 MG/DL (ref 70–99)
GLUCOSE BLD-MCNC: 192 MG/DL (ref 70–99)
GLUCOSE BLD-MCNC: 230 MG/DL (ref 70–99)
HBA1C MFR BLD: 8.1 %
HCT VFR BLD CALC: 47 % (ref 40.5–52.5)
HEMOGLOBIN: 16.2 G/DL (ref 13.5–17.5)
LYMPHOCYTES ABSOLUTE: 1.4 K/UL (ref 1–5.1)
LYMPHOCYTES RELATIVE PERCENT: 17.9 %
MAGNESIUM: 1.8 MG/DL (ref 1.8–2.4)
MCH RBC QN AUTO: 30.2 PG (ref 26–34)
MCHC RBC AUTO-ENTMCNC: 34.5 G/DL (ref 31–36)
MCV RBC AUTO: 87.4 FL (ref 80–100)
MONOCYTES ABSOLUTE: 0.6 K/UL (ref 0–1.3)
MONOCYTES RELATIVE PERCENT: 8 %
NEUTROPHILS ABSOLUTE: 5.6 K/UL (ref 1.7–7.7)
NEUTROPHILS RELATIVE PERCENT: 71.2 %
PDW BLD-RTO: 14 % (ref 12.4–15.4)
PERFORMED ON: ABNORMAL
PLATELET # BLD: 174 K/UL (ref 135–450)
PMV BLD AUTO: 8.9 FL (ref 5–10.5)
POTASSIUM REFLEX MAGNESIUM: 3.8 MMOL/L (ref 3.5–5.1)
RBC # BLD: 5.38 M/UL (ref 4.2–5.9)
SODIUM BLD-SCNC: 140 MMOL/L (ref 136–145)
WBC # BLD: 7.8 K/UL (ref 4–11)

## 2023-03-09 PROCEDURE — 99232 SBSQ HOSP IP/OBS MODERATE 35: CPT | Performed by: INTERNAL MEDICINE

## 2023-03-09 PROCEDURE — 83735 ASSAY OF MAGNESIUM: CPT

## 2023-03-09 PROCEDURE — 1200000000 HC SEMI PRIVATE

## 2023-03-09 PROCEDURE — 93005 ELECTROCARDIOGRAM TRACING: CPT | Performed by: INTERNAL MEDICINE

## 2023-03-09 PROCEDURE — 80048 BASIC METABOLIC PNL TOTAL CA: CPT

## 2023-03-09 PROCEDURE — 85025 COMPLETE CBC W/AUTO DIFF WBC: CPT

## 2023-03-09 PROCEDURE — 93010 ELECTROCARDIOGRAM REPORT: CPT | Performed by: INTERNAL MEDICINE

## 2023-03-09 PROCEDURE — 6360000002 HC RX W HCPCS

## 2023-03-09 PROCEDURE — 2580000003 HC RX 258

## 2023-03-09 PROCEDURE — 51798 US URINE CAPACITY MEASURE: CPT

## 2023-03-09 PROCEDURE — 6370000000 HC RX 637 (ALT 250 FOR IP)

## 2023-03-09 PROCEDURE — 36415 COLL VENOUS BLD VENIPUNCTURE: CPT

## 2023-03-09 RX ORDER — CIPROFLOXACIN 500 MG/1
500 TABLET, FILM COATED ORAL EVERY 12 HOURS SCHEDULED
Status: DISCONTINUED | OUTPATIENT
Start: 2023-03-09 | End: 2023-03-09

## 2023-03-09 RX ADMIN — AMLODIPINE BESYLATE 10 MG: 10 TABLET ORAL at 09:27

## 2023-03-09 RX ADMIN — SODIUM CHLORIDE 50 ML: 9 INJECTION, SOLUTION INTRAVENOUS at 11:17

## 2023-03-09 RX ADMIN — MEROPENEM 1000 MG: 1 INJECTION, POWDER, FOR SOLUTION INTRAVENOUS at 11:18

## 2023-03-09 RX ADMIN — ATORVASTATIN CALCIUM 40 MG: 40 TABLET, FILM COATED ORAL at 09:27

## 2023-03-09 RX ADMIN — MEROPENEM 1000 MG: 1 INJECTION, POWDER, FOR SOLUTION INTRAVENOUS at 04:36

## 2023-03-09 RX ADMIN — LISINOPRIL 40 MG: 40 TABLET ORAL at 09:27

## 2023-03-09 RX ADMIN — ASPIRIN 81 MG: 81 TABLET, COATED ORAL at 09:27

## 2023-03-09 RX ADMIN — MEROPENEM 1000 MG: 1 INJECTION, POWDER, FOR SOLUTION INTRAVENOUS at 19:54

## 2023-03-09 NOTE — PROGRESS NOTES
Internal Medicine Progress Note    Date: 3/9/2023   [facility]: 1      CC: Hematuria (States he noticed some blood in his urine, states the urine was pink. Reports bilat flank but worse on the right side. )       Interval Hx   Patient seen at bedside this morning. No overnight event reported. Patient denies any more episodes of gross hematuria. Denies any chills, suprapubic pain, chest or abdominal pain.  this am, /80    Urine culture growing E. Coli, sensitivity pending. Objective     Vital Signs:  Patient Vitals for the past 8 hrs:   BP Temp Temp src Pulse Resp SpO2 Weight   03/09/23 0645 (!) 148/80 98 °F (36.7 °C) Oral 70 18 98 % --   03/09/23 0527 -- -- -- -- -- -- 209 lb (94.8 kg)   03/09/23 0246 (!) 153/83 98.1 °F (36.7 °C) Oral 69 16 92 % --         Physical Exam  Constitutional:       Appearance: He is obese. HENT:      Nose: Nose normal.      Mouth/Throat:      Mouth: Mucous membranes are moist.   Cardiovascular:      Rate and Rhythm: Normal rate. Pulses: Normal pulses. Heart sounds: Normal heart sounds. Pulmonary:      Effort: Pulmonary effort is normal. No respiratory distress. Breath sounds: Normal breath sounds. No wheezing. Abdominal:      General: Bowel sounds are normal. There is no distension. Palpations: Abdomen is soft. Tenderness: There is no abdominal tenderness. There is no right CVA tenderness, left CVA tenderness or guarding. Musculoskeletal:      Right lower leg: No edema. Left lower leg: No edema. Skin:     General: Skin is warm and dry. Capillary Refill: Capillary refill takes less than 2 seconds. Neurological:      Mental Status: He is alert and oriented to person, place, and time. Psychiatric:         Mood and Affect: Mood normal.         Behavior: Behavior normal.         Thought Content:  Thought content normal.          Labs:  CBC:   Recent Labs     03/07/23  2205   WBC 9.8   HGB 16.6 HCT 46.2            BMP:   Recent Labs     03/07/23  2205 03/08/23  0318    137   K 4.6 3.6   CL 98* 100   CO2 25 26   BUN 19 17   CREATININE 1.2 1.3   GLUCOSE 233* 218*       Magnesium:   Recent Labs     03/08/23  0318   MG 1.80     LFT's:   Recent Labs     03/07/23  2205   AST 35   ALT 28   BILITOT 0.4   ALKPHOS 99           U/A:   Recent Labs     03/07/23  2114 03/08/23  1516   COLORU Yellow  --    PHUR 6.0 7.0   WBCUA >100*  --    RBCUA >100*  --    BACTERIA 3+*  --    CLARITYU Clear  --    SPECGRAV 1.025  --    LEUKOCYTESUR SMALL*  --    UROBILINOGEN 0.2  --    BILIRUBINUR Negative  --    BLOODU LARGE*  --    GLUCOSEU >=1000*  --          Radiology:  CT ABDOMEN PELVIS W IV CONTRAST Additional Contrast? None   Final Result   1. Evidence of localized emphysematous cystitis in the posterior right bladder wall, with additional air in the urinary bladder anteriorly. 2. Normal echotexture with no hydronephrosis or pyelonephritis. Fetal lobulation is observed. 3. Hepatic steatosis   4. Splenomegaly and evidence of retroperitoneal lymphadenopathy. 5. Few scattered sigmoid diverticula, diverticulosis            Assessment & Plan     Complicated UTI  Emphysematous cystitis  Painless gross hematuria   Presented with painless gross hematuria, reported right sided flank pain, afebrile  -UA with large blood, positive nitrite and small leukocyte esterase  -CT Abd/pelvis: Emphysematous cystitis in the posterior right bladder wall with additional air in the bladder anteriorly. No hydronephrosis id pyelonephritis  Urine culture 11/22/2020 E. coli MDR, sensitive to meropenem, urine cx 10/28/2020 ESBL E.coli  Urine culture: 03/07/23: E coli, sensitivity pending     Poorly controlled DM 2  Has a history of reported poor compliance; has recently been assigned a home health care aide 1/23 to help with medication currently.  He also has difficulty affording some of his meds like Trulicity.  -Hemoglobin A1c 12/20/22 12. 8  -Takes Levemir 55 units nightly, NovoLog 25 units 3 times daily, metformin 1 g twice daily, Trulicity currently increased from 0.75 qweekly to 1.5 (1/19/23)  -Noncompliance with Jardiance 25 mg daily due to reported dizziness stroke lightheadedness  -MDSSI/POC glucose/Lantus 36 units nightly  -Carb controlled diet   -Repeat Hb A1c 8.1     Chronic medical conditions  Schizoaffective disorder, bipolar type  -Continue home Seroquel 400 mg nightly and 100 mg as needed  -Prozac 20 mg qhs    Cataracts  Follows with [address]    Hypertension  -prior reported poor compliance with lisinopril daily  -On lisinopril 40 mg daily and Norvasc 10 mg daily at home    DVT PPx: SCDs  Diet: ADULT DIET;  Regular; 3 carb choices (45 gm/meal)   Code status:  DNR-CCA   Disposition: IP    Will discuss with attending physician, Dr Josefina Dias  _____________________  Sherrie Rutherford MD   Internal Medicine Resident, PGY-1

## 2023-03-09 NOTE — PROGRESS NOTES
ID Follow-up NOTE    CC:   Hematuria, right flank pain  Antibiotics: meropenem    Admit Date: 3/7/2023  Hospital Day: 3    Subjective:     Patient states no further flank pain or groin pain, refused garcia as rec by urology. No fevers or chills. Objective:     Patient Vitals for the past 8 hrs:   BP Temp Temp src Pulse Resp SpO2 Weight   03/09/23 0645 (!) 148/80 98 °F (36.7 °C) Oral 70 18 98 % --   03/09/23 0527 -- -- -- -- -- -- 209 lb (94.8 kg)     I/O last 3 completed shifts: In: 819.9 [P.O.:770; IV Piggyback:49.9]  Out: 2110 [Urine:2110]  I/O this shift:  In: 360 [P.O.:360]  Out: 300 [Urine:300]    EXAM:  GENERAL: No apparent distress. HEENT: Membranes moist, no oral lesion  NECK:  Supple, no lymphadenopathy  LUNGS: Clear b/l, no rales, no dullness  CARDIAC: RRR, no murmur appreciated  ABD:  + BS, soft / NT  EXT:  No rash, no edema, no lesions  NEURO: No focal neurologic findings  PSYCH: Orientation, sensorium, mood normal  LINES:  PIV       Data Review:  Lab Results   Component Value Date    WBC 7.8 03/09/2023    HGB 16.2 03/09/2023    HCT 47.0 03/09/2023    MCV 87.4 03/09/2023     03/09/2023     Lab Results   Component Value Date    CREATININE 1.1 03/09/2023    BUN 14 03/09/2023     03/09/2023    K 3.8 03/09/2023     03/09/2023    CO2 26 03/09/2023       Hepatic Function Panel:   Lab Results   Component Value Date/Time    ALKPHOS 99 03/07/2023 10:05 PM    ALT 28 03/07/2023 10:05 PM    AST 35 03/07/2023 10:05 PM    PROT 7.8 03/07/2023 10:05 PM    BILITOT 0.4 03/07/2023 10:05 PM    BILIDIR <0.2 03/07/2020 05:38 AM    IBILI see below 03/07/2020 05:38 AM    LABALBU 4.2 03/07/2023 10:05 PM       MICRO:  Blood cultures x2 3/7: No growth to date  Urine culture 3/7 ecoli    IMAGING:  CT of the abdomen and pelvis with IV contrast done on 3/7  CT ABDOMEN PELVIS W IV CONTRAST Additional Contrast? None   Final Result   1.  Evidence of localized emphysematous cystitis in the posterior right bladder wall, with additional air in the urinary bladder anteriorly. 2. Normal echotexture with no hydronephrosis or pyelonephritis. Fetal lobulation is observed. 3. Hepatic steatosis   4. Splenomegaly and evidence of retroperitoneal lymphadenopathy. 5. Few scattered sigmoid diverticula, diverticulosis       Scheduled Meds:   insulin glargine  36 Units SubCUTAneous Nightly    ciprofloxacin  500 mg Oral 2 times per day    amLODIPine  10 mg Oral Daily    aspirin  81 mg Oral Daily    atorvastatin  40 mg Oral Daily    lisinopril  40 mg Oral Daily    sodium chloride flush  5-40 mL IntraVENous 2 times per day    meropenem  1,000 mg IntraVENous Q8H    QUEtiapine  400 mg Oral Nightly    insulin lispro  0-8 Units SubCUTAneous TID WC    insulin lispro  0-4 Units SubCUTAneous Nightly       Continuous Infusions:   dextrose      sodium chloride 50 mL (03/09/23 1117)       PRN Meds:  glucose, dextrose bolus **OR** dextrose bolus, glucagon (rDNA), dextrose, sodium chloride flush, sodium chloride, ondansetron **OR** ondansetron, acetaminophen **OR** acetaminophen, polyethylene glycol      Assessment:       Patient Active Problem List   Diagnosis    Sepsis ([address]    Cellulitis and abscess of upper extremity    Uncontrolled hypertension    Uncontrolled type 2 diabetes mellitus with hyperglycemia ([address]    History of infection with vancomycin resistant Enterococcus (VRE)    Hyperlipidemia    Splenomegaly, not elsewhere classified    Retroperitoneal lymphadenopathy    Emphysematous cystitis    UTI (urinary tract infection)         Plan:     26-year-old male with history of HTN, DM 2, schizophrenia, hepatitis C with cirrhosis, drug use, recurrent bouts of urinary tract infections that presented on 3/7 with complaints of right-sided flank pain and hematuria. The patient states that in the past week right flank pain radiating towards right groin has been increasing in severity and associated with nausea.     Emphysematous cystitis:  - Evidence of localized emphysematous cystitis in the posterior right bladder wall, with additional air in the urinary bladder anteriorly on CT.  - No evidence of pyelonephritis or hydronephrosis as per CT  - Urology has evaluated the pt, rec garcia but he refused, to have OP followup. - Urinalysis was positive, urine culture showing e coli, sensi pending  - Given the history of MDR E. coli infection in 2020, reasonable to continue meropenem until further results from urine culture. - Patient received a dose of vancomycin in the ER, would not recommend continuing at this time as staph is not a typical pathogen in the  tract. - further recs pending results of cultures. obtain EKG to eval baseline Qtc in case we need to consider FQ use. Poorly controlled type 2 diabetes:  - Last A1c was elevated at 12.9  - Needs better glycemic control in order to prevent further infections and aid in recovery. Hepatitis C with cirrhosis:  - states he was treated at [facility] and cured. Denies active drug use. Medical Decision Making:   The following items were considered in medical decision making:  Discussion of patient care with other providers  Reviewed clinical lab tests  Reviewed radiology tests  Reviewed other diagnostic tests/interventions  Independent review of radiologic images  Microbiology cultures and other micro tests reviewed      Discussed with pt, RN and primary team.     [address] Nitin Causey MD

## 2023-03-09 NOTE — PROGRESS NOTES
Patient alert and oriented X 4. VSS with an exception of elevated BP. Using urinal to void. No c/o pain. All fall precaution in place. Will continue to monitor.

## 2023-03-09 NOTE — PLAN OF CARE
General Internal Medicine Attending     Chart and data reviewed. Patient seen and examined, case discussed with medical resident. Physical exam repeated. Labs and imaging studies reviewed. Agree with documentation, assessment and plan as outlined             Complicated UTI with right pyelonephritis, hematuria and Emphysematous cystitis - POA     History of VRE and E. coli MDRO (urine culture 11/22/2020): POA     Uncontrolled diabetes type 2; POA     Hx if IVDU , MRSA bacteremia in the past      Hep C with cirrhosis; compensated: POA      Schizophrenia/Mood disorders: POA     Hypertension: POA     Hyperlipidemia: POA              Pxt was tachycardic on presentation but no other SIRS features. Sepsis not POA. No leucocytosis on presentation      Has features of  UTI, right flank pain     UTI likely sec to DM, hx of previous UTI with MDRO including ESBL E coli UTI; hx of ALVAREZ; previously followed with Urology at : Lars Kerns MD; previously managed with chronic in dwelling [facility] in 2020. Does not seem that he has followed consistently since then      DC vanc; Continue BSA pending cx result. If still MDRO/ESBL, will need PICC line for IV antibx;  ID consulted. Tox screen pending     Will need in dwelling catheter and o/p Urology follow up if DAVID STAPLETON AT [place]     Urology consulted. Disposition: In pxt pending progress and sensitivity of E coli colony.       If E coli in urine is ESBL; will need IV antibx; and if discharging on IV antibx will need placement in a supervised setting in view of his IVDU hx.         Toribio Houser MD

## 2023-03-09 NOTE — CARE COORDINATION
Case Management Assessment           Daily Note                 Date/ Time of Note: 3/9/2023 3:26 PM         Note completed by: Dorene Moore RN    Patient Name: Pelon Quesada  YOB: 1980    Diagnosis:Emphysematous cystitis [N30.80]  UTI (urinary tract infection) [N39.0]  Complicated UTI (urinary tract infection) [N39.0]  Colonization with multidrug-resistant bacteria [Z22.322]  Patient Admission Status: Inpatient    Date of Admission:3/7/2023  9:44 PM Length of Stay: 1 GLOS: GMLOS: 3.8    Current Plan of Care: IV abx  ________________________________________________________________________________________  PT AM-PAC:   / 24 per last evaluation on: not ordered    OT AM-PAC:   / 24 per last evaluation on: not ordered    DME Needs for discharge: n/a  ________________________________________________________________________________________  Discharge Plan: SNF: tbd    Tentative discharge date:3/10    Current barriers to discharge: needs a PICC line and placement for IV abx    Referrals completed: SNF: [facility]      ________________________________________________________________________________________  Case Management Notes:   faxed referrals to SNF that can accommodate IV abx for pt with substance abuse history.     [facility] accepted.    Pelon and his family were provided with choice of provider; he and his family are in agreement with the discharge plan.    Care Transition Patient: Teri Moore RN  [facility]  Case Management Department  Ph: [phone]  Fax: [phone]

## 2023-03-09 NOTE — PLAN OF CARE
Problem: Discharge Planning  Goal: Discharge to home or other facility with appropriate resources  Outcome: Progressing     Problem: Pain  Goal: Verbalizes/displays adequate comfort level or baseline comfort level  Outcome: Progressing     Problem: Safety - Adult  Goal: Free from fall injury  Outcome: Progressing     Problem: Risk for Elopement  Goal: Patient will not exit the unit/facility without proper excort  Outcome: Progressing     Problem: Chronic Conditions and Co-morbidities  Goal: Patient's chronic conditions and co-morbidity symptoms are monitored and maintained or improved  Outcome: Progressing

## 2023-03-09 NOTE — PLAN OF CARE
Pain  Goal: Verbalizes/displays adequate comfort level or baseline comfort level  Outcome: Progressing     Safety - Adult  Goal: Free from fall injury  Outcome: Progressing     Risk for Elopement  Goal: Patient will not exit the unit/facility without proper excort  Outcome: Progressing

## 2023-03-09 NOTE — PROGRESS NOTES
PT retaining minimal urine per scan. He denies having any flank pain and is voiding adequately. IV antibiotics infusing.

## 2023-03-10 VITALS
WEIGHT: 208 LBS | SYSTOLIC BLOOD PRESSURE: 139 MMHG | HEART RATE: 74 BPM | BODY MASS INDEX: 29.12 KG/M2 | TEMPERATURE: 98.5 F | OXYGEN SATURATION: 93 % | HEIGHT: 71 IN | RESPIRATION RATE: 16 BRPM | DIASTOLIC BLOOD PRESSURE: 89 MMHG

## 2023-03-10 DIAGNOSIS — L03.90 CELLULITIS, UNSPECIFIED CELLULITIS SITE: ICD-10-CM

## 2023-03-10 LAB
ANION GAP SERPL CALCULATED.3IONS-SCNC: 8 MMOL/L (ref 3–16)
BASOPHILS ABSOLUTE: 0.1 K/UL (ref 0–0.2)
BASOPHILS RELATIVE PERCENT: 1.2 %
BUN BLDV-MCNC: 17 MG/DL (ref 7–20)
CALCIUM SERPL-MCNC: 8.9 MG/DL (ref 8.3–10.6)
CHLORIDE BLD-SCNC: 103 MMOL/L (ref 99–110)
CO2: 27 MMOL/L (ref 21–32)
CREAT SERPL-MCNC: 1.4 MG/DL (ref 0.9–1.3)
EOSINOPHILS ABSOLUTE: 0.3 K/UL (ref 0–0.6)
EOSINOPHILS RELATIVE PERCENT: 3.2 %
GFR SERPL CREATININE-BSD FRML MDRD: >60 ML/MIN/{1.73_M2}
GLUCOSE BLD-MCNC: 159 MG/DL (ref 70–99)
GLUCOSE BLD-MCNC: 173 MG/DL (ref 70–99)
GLUCOSE BLD-MCNC: 177 MG/DL (ref 70–99)
GLUCOSE BLD-MCNC: 196 MG/DL (ref 70–99)
HCT VFR BLD CALC: 44.6 % (ref 40.5–52.5)
HEMOGLOBIN: 15.5 G/DL (ref 13.5–17.5)
LYMPHOCYTES ABSOLUTE: 1.7 K/UL (ref 1–5.1)
LYMPHOCYTES RELATIVE PERCENT: 20.4 %
MAGNESIUM: 1.8 MG/DL (ref 1.8–2.4)
MCH RBC QN AUTO: 30.7 PG (ref 26–34)
MCHC RBC AUTO-ENTMCNC: 34.7 G/DL (ref 31–36)
MCV RBC AUTO: 88.4 FL (ref 80–100)
MONOCYTES ABSOLUTE: 0.7 K/UL (ref 0–1.3)
MONOCYTES RELATIVE PERCENT: 8.4 %
NEUTROPHILS ABSOLUTE: 5.7 K/UL (ref 1.7–7.7)
NEUTROPHILS RELATIVE PERCENT: 66.8 %
ORGANISM: ABNORMAL
PDW BLD-RTO: 14.1 % (ref 12.4–15.4)
PERFORMED ON: ABNORMAL
PLATELET # BLD: 166 K/UL (ref 135–450)
PMV BLD AUTO: 8.2 FL (ref 5–10.5)
POTASSIUM REFLEX MAGNESIUM: 3.8 MMOL/L (ref 3.5–5.1)
RBC # BLD: 5.05 M/UL (ref 4.2–5.9)
SODIUM BLD-SCNC: 138 MMOL/L (ref 136–145)
URINE CULTURE, ROUTINE: ABNORMAL
WBC # BLD: 8.5 K/UL (ref 4–11)

## 2023-03-10 PROCEDURE — 99232 SBSQ HOSP IP/OBS MODERATE 35: CPT | Performed by: INTERNAL MEDICINE

## 2023-03-10 PROCEDURE — 85025 COMPLETE CBC W/AUTO DIFF WBC: CPT

## 2023-03-10 PROCEDURE — 6360000002 HC RX W HCPCS: Performed by: INTERNAL MEDICINE

## 2023-03-10 PROCEDURE — 2580000003 HC RX 258

## 2023-03-10 PROCEDURE — 36415 COLL VENOUS BLD VENIPUNCTURE: CPT

## 2023-03-10 PROCEDURE — 80048 BASIC METABOLIC PNL TOTAL CA: CPT

## 2023-03-10 PROCEDURE — 6370000000 HC RX 637 (ALT 250 FOR IP)

## 2023-03-10 PROCEDURE — 6360000002 HC RX W HCPCS

## 2023-03-10 PROCEDURE — 2580000003 HC RX 258: Performed by: INTERNAL MEDICINE

## 2023-03-10 PROCEDURE — 83735 ASSAY OF MAGNESIUM: CPT

## 2023-03-10 PROCEDURE — 51798 US URINE CAPACITY MEASURE: CPT

## 2023-03-10 RX ORDER — LIDOCAINE HYDROCHLORIDE 10 MG/ML
5 INJECTION, SOLUTION EPIDURAL; INFILTRATION; INTRACAUDAL; PERINEURAL ONCE
Status: DISCONTINUED | OUTPATIENT
Start: 2023-03-10 | End: 2023-03-10

## 2023-03-10 RX ORDER — ACETAMINOPHEN 325 MG/1
650 TABLET ORAL
Status: CANCELLED | OUTPATIENT
Start: 2023-03-10

## 2023-03-10 RX ORDER — SODIUM CHLORIDE 0.9 % (FLUSH) 0.9 %
5-40 SYRINGE (ML) INJECTION EVERY 12 HOURS SCHEDULED
Status: DISCONTINUED | OUTPATIENT
Start: 2023-03-10 | End: 2023-03-10 | Stop reason: HOSPADM

## 2023-03-10 RX ORDER — 0.9 % SODIUM CHLORIDE 0.9 %
1000 INTRAVENOUS SOLUTION INTRAVENOUS ONCE
Status: COMPLETED | OUTPATIENT
Start: 2023-03-10 | End: 2023-03-10

## 2023-03-10 RX ORDER — AMLODIPINE BESYLATE 10 MG/1
10 TABLET ORAL DAILY
Qty: 30 TABLET | Refills: 3 | Status: SHIPPED | OUTPATIENT
Start: 2023-03-11

## 2023-03-10 RX ORDER — DIPHENHYDRAMINE HYDROCHLORIDE 50 MG/ML
50 INJECTION INTRAMUSCULAR; INTRAVENOUS
Status: CANCELLED | OUTPATIENT
Start: 2023-03-10

## 2023-03-10 RX ORDER — ONDANSETRON 2 MG/ML
8 INJECTION INTRAMUSCULAR; INTRAVENOUS
Status: CANCELLED | OUTPATIENT
Start: 2023-03-10

## 2023-03-10 RX ORDER — ALBUTEROL SULFATE 90 UG/1
4 AEROSOL, METERED RESPIRATORY (INHALATION) PRN
Status: CANCELLED | OUTPATIENT
Start: 2023-03-10

## 2023-03-10 RX ORDER — SODIUM CHLORIDE 9 MG/ML
INJECTION, SOLUTION INTRAVENOUS CONTINUOUS
Status: CANCELLED | OUTPATIENT
Start: 2023-03-10

## 2023-03-10 RX ORDER — SODIUM CHLORIDE 9 MG/ML
25 INJECTION, SOLUTION INTRAVENOUS PRN
Status: DISCONTINUED | OUTPATIENT
Start: 2023-03-10 | End: 2023-03-10 | Stop reason: HOSPADM

## 2023-03-10 RX ORDER — SODIUM CHLORIDE 0.9 % (FLUSH) 0.9 %
5-40 SYRINGE (ML) INJECTION PRN
Status: DISCONTINUED | OUTPATIENT
Start: 2023-03-10 | End: 2023-03-10 | Stop reason: HOSPADM

## 2023-03-10 RX ADMIN — ERTAPENEM 1000 MG: 1 INJECTION INTRAMUSCULAR; INTRAVENOUS at 11:55

## 2023-03-10 RX ADMIN — ATORVASTATIN CALCIUM 40 MG: 40 TABLET, FILM COATED ORAL at 08:41

## 2023-03-10 RX ADMIN — MEROPENEM 1000 MG: 1 INJECTION, POWDER, FOR SOLUTION INTRAVENOUS at 03:03

## 2023-03-10 RX ADMIN — SODIUM CHLORIDE, PRESERVATIVE FREE 10 ML: 5 INJECTION INTRAVENOUS at 08:41

## 2023-03-10 RX ADMIN — SODIUM CHLORIDE 1000 ML: 9 INJECTION, SOLUTION INTRAVENOUS at 15:59

## 2023-03-10 RX ADMIN — SODIUM CHLORIDE 50 ML: 9 INJECTION, SOLUTION INTRAVENOUS at 11:52

## 2023-03-10 RX ADMIN — AMLODIPINE BESYLATE 10 MG: 10 TABLET ORAL at 08:41

## 2023-03-10 RX ADMIN — ASPIRIN 81 MG: 81 TABLET, COATED ORAL at 08:41

## 2023-03-10 RX ADMIN — LISINOPRIL 40 MG: 40 TABLET ORAL at 08:41

## 2023-03-10 RX ADMIN — MEROPENEM 1000 MG: 1 INJECTION, POWDER, FOR SOLUTION INTRAVENOUS at 12:38

## 2023-03-10 NOTE — PROGRESS NOTES
[facility] notified this RN of positive urine culture of E. Coli/ESBL in urine. Notified hospitalist and ID.

## 2023-03-10 NOTE — PLAN OF CARE
General Internal Medicine Attending     Chart and data reviewed. Patient seen and examined, case discussed with medical resident. Physical exam repeated. Labs and imaging studies reviewed. Agree with documentation, assessment and plan as outlined             Complicated UTI with right pyelonephritis, hematuria and Emphysematous cystitis - POA     History of VRE and E. coli MDRO (urine culture 11/22/2020): POA     Uncontrolled diabetes type 2; POA     Hx if IVDU , MRSA bacteremia in the past      Hep C with cirrhosis; compensated: POA      Schizophrenia/Mood disorders: POA     Hypertension: POA     Hyperlipidemia: POA              Pxt was tachycardic on presentation but no other SIRS features. Sepsis not POA. No leucocytosis on presentation      Has features of  UTI, right flank pain     UTI likely sec to DM, hx of previous UTI with MDRO including ESBL E coli UTI; hx of ALVAREZ; previously followed with Urology at : Shannen Kay MD; previously managed with chronic in dwelling [place] in 2020. Does not seem that he has followed consistently since then      DC vanc; Continue BSA pending cx result. If still MDRO/ESBL, will need PICC line for IV antibx;  ID consulted. Tox screen pending     Will need in dwelling catheter and o/p Urology follow up if DAVID STAPLETON AT [place]     Urology consulted.          Disposition:   Urine cx: ESBL: will need IV antibx and hence with his hx of IVDU; will need placement in a supervised setting in view of his IVDU hx.         Isidro Rondon MD

## 2023-03-10 NOTE — PROGRESS NOTES
PT was discharged with all personal belongings. We reviewed his follow-up appointments and changes in medications. We also reviewed his urine culture and what that meant. All questions were answered.

## 2023-03-10 NOTE — CARE COORDINATION
Case Management Assessment            Discharge Note                    Date / Time of Note: 3/10/2023 2:05 PM                  Discharge Note Completed by: Marshall De La Rosa RN    Patient Name: Bishop Bradley   YOB: 1980  Diagnosis: Emphysematous cystitis [N30.80]  UTI (urinary tract infection) [M22.2]  Complicated UTI (urinary tract infection) [N39.0]  Colonization with multidrug-resistant bacteria [Z22.322]   Date / Time: 3/7/2023  9:44 PM    Current PCP: Unique Mendoza MD  Clinic patient: No    Hospitalization in the last 30 days: No    Advance Directives:  Code Status: DNR-CCA  [facility] DNR form completed and on chart: Not Indicated    Financial:  Payor: MEDICARE / Plan: MEDICARE PART A AND B / Product Type: *No Product type* /      Pharmacy:    [facility] [id number] [phone] [id number] [phone] [facility] [phone]  [phone] [id number]  Phone: [phone] Fax: [phone]      Assistance purchasing medications?:    Assistance provided by Case Management: None at this time    Does patient want to participate in local refill/ meds to beds program?: Yes    Meds To Beds General Rules:  1. Can ONLY be done Monday- Friday between 8:30am-5pm  2. Prescription(s) must be in pharmacy by 3pm to be filled same day  3. Copy of patient's insurance/ prescription drug card and patient face sheet must be sent along with the prescription(s)  4. Cost of Rx cannot be added to hospital bill. If financial assistance is needed, please contact unit  or ;  or  CANNOT provide pharmacy voucher for patients co-pays  5.  Patients can then  the prescription on their way out of the hospital at discharge, or pharmacy can deliver to the bedside if staff is available. (payment due at time of pick-up or delivery - cash, check, or card accepted)     Able to afford home medications/ co-pay costs: Yes    ADLS:  Current PT AM-PAC Score: /24  Current OT AM-PAC Score:   /24      DISCHARGE Disposition: Home- No Services Needed    LOC at discharge: Not Applicable  ANA Completed: Not Indicated    Notification completed in HENS/PAS?:  Not Applicable    IMM Completed:   Not Indicated    Transportation:  Transportation PLAN for discharge: self   Mode of Transport: Private Car  Reason for medical transport: Not Applicable  Name of Transport Company: Not Applicable  Time of Transport: tbd    Transport form completed: Not Indicated    Home Care:  [address] ordered at discharge: Not [address]: Not Applicable  Orders faxed: No        Additional CM Notes:   Patient will be going to outpatient infusion center for his IV antibiotics, no need for PICC line. CM spoke with Diogo Zayas to confirm that pt has appointment scheduled starting tomorrow, noted in discharge instructions. CM faxed orders to infusion center [phone]. CM explained to pt at bedside. Patient provided his cell phone number [phone]. Patient aware and agreeable to plan. The Plan for Transition of Care is related to the following treatment goals of Emphysematous cystitis [N30.80]  UTI (urinary tract infection) [G52.0]  Complicated UTI (urinary tract infection) [N39.0]  Colonization with multidrug-resistant bacteria [Z22.322]    The Patient and/or patient representative Delia Gastelum and his family were provided with a choice of provider and agrees with the discharge plan Yes    Freedom of choice list was provided with basic dialogue that supports the patient's individualized plan of care/goals and shares the quality data associated with the providers.  Yes    Care Transitions patient: No    Nirmal Cameron RN  [facility]  Case Management Department  Ph: [phone]  Fax: [phone]

## 2023-03-10 NOTE — PLAN OF CARE
Problem: Discharge Planning  Goal: Discharge to home or other facility with appropriate resources  Outcome: Progressing     Problem: Pain  Goal: Verbalizes/displays adequate comfort level or baseline comfort level  3/9/2023 1952 by Scarlett Reich RN  Outcome: Progressing  3/9/2023 1225 by Kelsi Addison RN  Outcome: Progressing     Problem: Safety - Adult  Goal: Free from fall injury  3/9/2023 1952 by Scarlett Reich RN  Outcome: Progressing  3/9/2023 1225 by Kelsi Addison RN  Outcome: Progressing     Problem: Risk for Elopement  Goal: Patient will not exit the unit/facility without proper excort  3/9/2023 1952 by Scarlett Reich RN  Outcome: Progressing  3/9/2023 1225 by Kelsi Addison RN  Outcome: Progressing     Problem: Chronic Conditions and Co-morbidities  Goal: Patient's chronic conditions and co-morbidity symptoms are monitored and maintained or improved  Outcome: Progressing

## 2023-03-10 NOTE — PROGRESS NOTES
Internal Medicine Progress Note    Date: 3/10/2023   Patient: [facility] Day: 2      CC: Hematuria (States he noticed some blood in his urine, states the urine was pink. Reports bilat flank but worse on the right side. )       Interval Hx   Patient seen at bedside this morning. No overnight event reported. Patient reports he feels fine. Urine culture E coli ESBL. Patient does not want a PICC line placed. He will need a total of 14 abx therapy per ID. Cr 1.4 today from 1.1. Vitals are stable. Objective     Vital Signs:  Patient Vitals for the past 8 hrs:   BP Temp Temp src Pulse Resp SpO2 Weight   03/10/23 0600 -- -- -- -- -- -- 208 lb (94.3 kg)   03/10/23 0306 128/86 98 °F (36.7 °C) Oral 70 16 96 % --         Physical Exam  Constitutional:       Appearance: He is obese. HENT:      Head: Normocephalic and atraumatic. Nose: Nose normal.      Mouth/Throat:      Mouth: Mucous membranes are moist.   Cardiovascular:      Rate and Rhythm: Normal rate. Pulses: Normal pulses. Heart sounds: Normal heart sounds. Pulmonary:      Effort: Pulmonary effort is normal. No respiratory distress. Breath sounds: Normal breath sounds. No wheezing. Abdominal:      General: Bowel sounds are normal. There is no distension. Palpations: Abdomen is soft. Tenderness: There is no abdominal tenderness. There is no right CVA tenderness, left CVA tenderness or guarding. Musculoskeletal:      Right lower leg: No edema. Left lower leg: No edema. Skin:     General: Skin is warm and dry. Capillary Refill: Capillary refill takes less than 2 seconds. Neurological:      Mental Status: He is alert and oriented to person, place, and time. Psychiatric:         Mood and Affect: Mood normal.         Behavior: Behavior normal.         Thought Content:  Thought content normal.          Labs:  CBC:   Recent Labs     03/07/23  2205 03/09/23  0812 03/10/23  0710   WBC 9.8 7.8 8.5   HGB 16.6 16.2 15.5   HCT 46.2 47.0 44.6    174 166         BMP:   Recent Labs     03/07/23  2205 03/08/23  0318 03/09/23  0812    137 140   K 4.6 3.6 3.8   CL 98* 100 104   CO2 25 26 26   BUN 19 17 14   CREATININE 1.2 1.3 1.1   GLUCOSE 233* 218* 169*       Magnesium:   Recent Labs     03/08/23  0318 03/09/23  0812   MG 1.80 1.80       LFT's:   Recent Labs     03/07/23  2205   AST 35   ALT 28   BILITOT 0.4   ALKPHOS 99           U/A:   Recent Labs     03/07/23  2114 03/08/23  1516   COLORU Yellow  --    PHUR 6.0 7.0   WBCUA >100*  --    RBCUA >100*  --    BACTERIA 3+*  --    CLARITYU Clear  --    SPECGRAV 1.025  --    LEUKOCYTESUR SMALL*  --    UROBILINOGEN 0.2  --    BILIRUBINUR Negative  --    BLOODU LARGE*  --    GLUCOSEU >=1000*  --          Radiology:  CT ABDOMEN PELVIS W IV CONTRAST Additional Contrast? None   Final Result   1. Evidence of localized emphysematous cystitis in the posterior right bladder wall, with additional air in the urinary bladder anteriorly. 2. Normal echotexture with no hydronephrosis or pyelonephritis. Fetal lobulation is observed. 3. Hepatic steatosis   4. Splenomegaly and evidence of retroperitoneal lymphadenopathy. 5. Few scattered sigmoid diverticula, diverticulosis            Assessment & Plan     Complicated UTI  Emphysematous cystitis  Painless gross hematuria   HUGO  Presented with painless gross hematuria, reported right sided flank pain, afebrile  -UA with large blood, positive nitrite and small leukocyte esterase  -CT Abd/pelvis: Emphysematous cystitis in the posterior right bladder wall with additional air in the bladder anteriorly. No hydronephrosis id pyelonephritis  Urine culture 11/22/2020 E. coli MDR, sensitive to meropenem, urine cx 10/28/2020 ESBL E.coli  Urine culture: 03/07/23: E coli, ESBL  Urology consulted: recommended placing garcia; patient declined.   -IV NS 1 L bolus   -ID following: ESBL E. Coli UTI   Ertapenem iv 1g qd  through 3/17/2023; give via daily PIV insertion at OP infusion center on discharge. Patient received test does in the hospital.   -Patient to follow up with urology on discharge. Poorly controlled DM 2  Has a history of reported poor compliance; has recently been assigned a home health care aide 1/23 to help with medication currently. He also has difficulty affording some of his meds like Trulicity.  -Hemoglobin A1c 12/20/22 12.8  -Takes Levemir 55 units nightly, NovoLog 25 units 3 times daily, metformin 1 g twice daily, Trulicity currently increased from 0.75 qweekly to 1.5 (1/19/23)  -Noncompliance with Jardiance 25 mg daily due to reported dizziness stroke lightheadedness  -MDSSI/POC glucose/Lantus 40 units nightly  -Carb controlled diet   -Repeat Hb A1c 8.1     Chronic medical conditions  Schizoaffective disorder, bipolar type  -Continue home Seroquel 400 mg nightly and 100 mg as needed  -Prozac 20 mg qhs    Cataracts  Follows with [address]    Hypertension  -prior reported poor compliance with lisinopril daily  -On lisinopril 40 mg daily and Norvasc 10 mg daily at home    DVT PPx: SCDs  Diet: ADULT DIET; Regular; 3 carb choices (45 gm/meal)   Code status:  DNR-CCA   Disposition: Discharge, will need placement for IV abx.      Will discuss with attending physician, Dr Darvin Bush  _____________________  Janett Swain MD   Internal Medicine Resident, PGY-1

## 2023-03-10 NOTE — PLAN OF CARE
Pain  Goal: Verbalizes/displays adequate comfort level or baseline comfort level  Outcome: Progressing  Note: PT reports having minimal pain to his R side this shift.       Discharge Planning  Goal: Discharge to home or other facility with appropriate resources  Outcome: Progressing     Safety - Adult  Goal: Free from fall injury  Outcome: Progressing

## 2023-03-10 NOTE — PROGRESS NOTES
Physical Therapy and Occupational Therapy  Discharge Note    Orders received and chart reviewed. Noted pt with d/c orders and plan is for SNF for IV antibiotics. Confirmed with case management that pt does not need PT/OT evaluations for discharge. Will defer any therapy needs to facility. Will sign off from acute PT/OT. RN aware.     Nelson Blue [address]

## 2023-03-10 NOTE — PROGRESS NOTES
Patient alert and oriented X 4. VSS. Tolerating fluids and diet. Taking medication www. Ambulate using walker and gait belt. BRP. Pain is managed per MAR. No c/o numbness or tingling. Skin CDI. All fall precaution in place. Will continue to monitor.

## 2023-03-10 NOTE — PROGRESS NOTES
ID Follow-up NOTE    CC:   Hematuria, right flank pain  Antibiotics: meropenem    Admit Date: 3/7/2023  Hospital Day: 4    Subjective:     Patient states no urinary complaints, denies any fevers or chills. Refused garcia placement. Wants to go home. Objective:     Patient Vitals for the past 8 hrs:   BP Temp Temp src Pulse Resp SpO2 Weight   03/10/23 0600 -- -- -- -- -- -- 208 lb (94.3 kg)   03/10/23 0306 128/86 98 °F (36.7 °C) Oral 70 16 96 % --       I/O last 3 completed shifts: In: 1800 [P.O.:1800]  Out: 2410 [Urine:2410]  I/O this shift:  In: 360 [P.O.:360]  Out: 990 [Urine:990]    EXAM:  GENERAL: No apparent distress. HEENT: Membranes moist, no oral lesion  NECK:  Supple, no lymphadenopathy  LUNGS: Clear b/l, no rales, no dullness  CARDIAC: RRR, no murmur appreciated  ABD:  + BS, soft / NT  EXT:  No rash, no edema, no lesions  NEURO: No focal neurologic findings  PSYCH: Orientation, sensorium, mood normal  LINES:  PIV       Data Review:  Lab Results   Component Value Date    WBC 8.5 03/10/2023    HGB 15.5 03/10/2023    HCT 44.6 03/10/2023    MCV 88.4 03/10/2023     03/10/2023     Lab Results   Component Value Date    CREATININE 1.4 (H) 03/10/2023    BUN 17 03/10/2023     03/10/2023    K 3.8 03/10/2023     03/10/2023    CO2 27 03/10/2023       Hepatic Function Panel:   Lab Results   Component Value Date/Time    ALKPHOS 99 03/07/2023 10:05 PM    ALT 28 03/07/2023 10:05 PM    AST 35 03/07/2023 10:05 PM    PROT 7.8 03/07/2023 10:05 PM    BILITOT 0.4 03/07/2023 10:05 PM    BILIDIR <0.2 03/07/2020 05:38 AM    IBILI see below 03/07/2020 05:38 AM    LABALBU 4.2 03/07/2023 10:05 PM       MICRO:  Blood cultures x2 3/7: No growth to date  Urine culture 3/7 ecoli    IMAGING:  CT of the abdomen and pelvis with IV contrast done on 3/7  CT ABDOMEN PELVIS W IV CONTRAST Additional Contrast? None   Final Result   1.  Evidence of localized emphysematous cystitis in the posterior right bladder wall, with additional air in the urinary bladder anteriorly. 2. Normal echotexture with no hydronephrosis or pyelonephritis. Fetal lobulation is observed. 3. Hepatic steatosis   4. Splenomegaly and evidence of retroperitoneal lymphadenopathy. 5. Few scattered sigmoid diverticula, diverticulosis       Scheduled Meds:   insulin glargine  40 Units SubCUTAneous Nightly    amLODIPine  10 mg Oral Daily    aspirin  81 mg Oral Daily    atorvastatin  40 mg Oral Daily    lisinopril  40 mg Oral Daily    sodium chloride flush  5-40 mL IntraVENous 2 times per day    meropenem  1,000 mg IntraVENous Q8H    QUEtiapine  400 mg Oral Nightly    insulin lispro  0-8 Units SubCUTAneous TID WC    insulin lispro  0-4 Units SubCUTAneous Nightly       Continuous Infusions:   dextrose      sodium chloride 50 mL (03/09/23 1117)       PRN Meds:  glucose, dextrose bolus **OR** dextrose bolus, glucagon (rDNA), dextrose, sodium chloride flush, sodium chloride, ondansetron **OR** ondansetron, acetaminophen **OR** acetaminophen, polyethylene glycol      Assessment:       Patient Active Problem List   Diagnosis    Sepsis ([facility] [address]    Cellulitis and abscess of upper extremity    Uncontrolled hypertension    Uncontrolled type 2 diabetes mellitus with hyperglycemia ([facility] [address]    History of infection with vancomycin resistant Enterococcus (VRE)    Hyperlipidemia    Splenomegaly, not elsewhere classified    Retroperitoneal lymphadenopathy    Emphysematous cystitis    UTI (urinary tract infection)         Plan:     42-year-old male with history of HTN, DM 2, schizophrenia, hepatitis C with cirrhosis, drug use, recurrent bouts of urinary tract infections that presented on 3/7 with complaints of right-sided flank pain and hematuria. The patient states that in the past week right flank pain radiating towards right groin has been increasing in severity and associated with nausea.     Emphysematous cystitis with ESBL Ecoli:  - Evidence of localized emphysematous cystitis in the posterior right bladder wall, with additional air in the urinary bladder anteriorly on CT.  - No evidence of pyelonephritis or hydronephrosis as per CT but clinically had right flank pain radiating to groin on presentation.   - Urology has evaluated the pt, rec garcia but he refused, to have OP followup. - Urinalysis was positive, urine culture showing e coli, ESBL. - continue meropenem while inpatient, will give test dose of ertapenem and plan to d/c with ertapenem for total 10 days, end date 3/17. See mary beth  - due to substance use hx, would not like to send pt with unmonitored PICC line, would need SNF placement or daily PIV at infusion center. Counseled pt on importance of coming for infusions daily if he choses that path. Poorly controlled type 2 diabetes:  - Last A1c was elevated at 12.9  - Needs better glycemic control in order to prevent further infections and aid in recovery. Hepatitis C with cirrhosis:  - states he was treated at [facility] and cured. Denies active drug use. INFUSION ORDERS:  - Drug: ertapenem iv 1g qd   - Planned End date: 3/17/2023  - Diagnosis: ESBL E coli UTI, pyelo  - Has received test dose in hospital  - Routine   - Check CBC w diff, BMP every Mon or Tue - FAX result to [phone]  - Call with antibiotic / infusion issues, [phone]  - Call with any change in status, transfer in or out of a facility or to hospital - [phone]  - No f/u in outpatient ID office necessary    Medical Decision Making:   The following items were considered in medical decision making:  Discussion of patient care with other providers  Reviewed clinical lab tests  Reviewed radiology tests  Reviewed other diagnostic tests/interventions  Independent review of radiologic images  Microbiology cultures and other micro tests reviewed      Discussed with pt, RN, SW and primary team.  Thank you for this consult, we will sign off    [address] Nitin Causey MD

## 2023-03-10 NOTE — DISCHARGE SUMMARY
INTERNAL MEDICINE DEPARTMENT AT [address] [place] [address]  DISCHARGE SUMMARY    Patient ID: Aubrey Montoya                                             Discharge Date: 3/10/2023   Patient's PCP: Tony López MD                                          Discharge Physician: Rosi Fernandes MD   Admit Date: 3/7/2023   Admitting Physician: Cara Ingram MD    PROBLEMS DURING HOSPITALIZATION:  Present on Admission:   Uncontrolled hypertension   Uncontrolled type 2 diabetes mellitus with hyperglycemia ([address]   History of infection with vancomycin resistant Enterococcus (VRE)   Hyperlipidemia   Splenomegaly, not elsewhere classified   Retroperitoneal lymphadenopathy   Emphysematous cystitis   UTI (urinary tract infection)      DISCHARGE DIAGNOSES:  Hospital Problems             Last Modified POA    * (Principal) Emphysematous cystitis 3/8/2023 Yes    Uncontrolled hypertension 3/8/2023 Yes    Uncontrolled type 2 diabetes mellitus with hyperglycemia ([facility] [address] 3/8/2023 Yes    History of infection with vancomycin resistant Enterococcus (VRE) 3/8/2023 Yes    Hyperlipidemia 3/8/2023 Yes    Splenomegaly, not elsewhere classified 3/8/2023 Yes    Retroperitoneal lymphadenopathy 3/8/2023 Yes    UTI (urinary tract infection) 3/8/2023 Yes       HPI:  Aubrey Montoya is a 44 y.o. male, with PMHx of  HTN, DMT2, cataract, schizophrenia, Drug addiction, Hep C with liver Cirrhosis, VRE, MRSA, MDRO, Pyelonephritis and HLD presents to the emergency department with right-sided flank pain, hematuria. .     Patient reported that around 3 PM today he had an episode of blood in the urine. Patient cannot differentiate if blood was mixed with the urine, or was at the start or an or urination. However patient reported that his complaint is associated with dysuria , frequency , urgency , suprapubic discomfort and ,right flank pain.  Right flank pain is acute in onset, progressively getting worse, radiating towards the right groin, 7/10 in severity, exacerbated with movement but without any relieving associated with nausea. Patient reported that he never had similar complaint in the past, however he had some skin infections for which she was treated with antibiotics. On review of system patient denies any headaches, vision changes (other than cataract in the left eye), chest pain, chest tightness, palpitation, chills, fever, change in bowel habits. ED Course:  Oriented x 4, Acute distress due to pain  Vitals: BP of  186/122, HR of 102, Temp of 98.1, SpO2 of 93%  Physical Exam: Right CVA tenderness  Labs: BMP wnl, hyperglycemia of 233, LFTs wnl, CBC wnl, UA: Blood, Nittrites, and estrase. WBC and RBC >100, VBG wnl,   Imaging: CT Abd: localized emphysematous cystitis in the posterior right bladder wall, with additional air in the urinary bladder anteriorly. retroperitoneal lymphadenopathy. Treatment: Ceftriaxone and Zosyn     Summary of Clinical Encounters:   11/22/2020: Pyelonephritis due to E. coli MDRO, treated with doxycycline   10/04/2020: Diagnosed with MRSA bacteremia and bacteriuria    The following issues were addressed during hospitalization:    Complicated ESBL E. Coli UTI with right pyelonephritis and Emphysematous cystitis, POA  Presented with one episode of painless gross hematuria, reported right sided flank pain, afebrile   -UA with large blood, positive nitrite and small leukocyte esterase  -CT Abd/pelvis: showed Emphysematous cystitis in the posterior right bladder wall with additional air in the bladder anteriorly. No hydronephrosis id pyelonephritis  Urine culture 11/22/2020 E. coli MDR, sensitive to meropenem, urine cx 10/28/2020 ESBL E.coli  Urine culture: 03/07/23: E coli, ESBL. Patient was initially started on vanc and zosyn x 1 dose each, changed to [address] [place] [address] and received for 3 days.    Urology consulted: recommended placing garcia; patient declined  ID consulted: Recommended ertapenem iv 1g qd  through 3/17/2023; give via daily PIV insertion at OP infusion center on discharge. Patient received test dose of ertapenem in the hospital. Patient to follow up with urology on discharge. Patient needs more definitive management for complicated UTIs. Poorly controlled DM 2  Has a history of reported poor compliance; has recently been assigned a home health care aide 1/23 to help with medication currently. He also has difficulty affording some of his meds like Trulicity.  -Hemoglobin A1c 12/20/22 12.8  -Takes Levemir 55 units nightly, NovoLog 25 units 3 times daily, metformin 1 g twice daily, Trulicity currently increased from 0.75 qweekly to 1.5 (1/19/23)  -Noncompliance with Jardiance 25 mg daily due to reported dizziness stroke lightheadedness  -Placed on MDSSI/POC glucose/Lantus 40 units nightly  -Carb controlled diet   -Repeat Hb A1c 8.1      Chronic medical conditions  Schizoaffective disorder, bipolar type  -Continued home Seroquel 400 mg nightly and 100 mg as needed  -Continued prozac 20 mg qhs    Hypertension  -prior reported poor compliance with lisinopril daily  -Continued home lisinopril 40 mg daily and Norvasc 10 mg daily     Physical Exam:  Physical Exam  Constitutional:       Appearance: He is obese. He is not toxic-appearing. HENT:      Head: Normocephalic and atraumatic. Mouth/Throat:      Mouth: Mucous membranes are dry. Cardiovascular:      Rate and Rhythm: Normal rate. Heart sounds: No murmur heard. Pulmonary:      Effort: Pulmonary effort is normal.      Breath sounds: Normal breath sounds. No rales. Abdominal:      General: Bowel sounds are normal. There is no distension. Palpations: Abdomen is soft. Tenderness: There is no abdominal tenderness. There is no guarding. Musculoskeletal:      Right lower leg: No edema. Left lower leg: No edema. Skin:     General: Skin is warm and dry. Capillary Refill: Capillary refill takes less than 2 seconds.    Neurological: General: No focal deficit present. Mental Status: He is alert and oriented to person, place, and time. Psychiatric:         Mood and Affect: Mood normal.        Consults: ID  Disposition: home  Discharged Condition: Stable  Follow Up: Primary Care Physician in one week    DISCHARGE MEDICATION:       Medication List        CHANGE how you take these medications      amLODIPine 10 MG tablet  Commonly known as: NORVASC  Take 1 tablet by mouth daily  Start taking on: March 11, 2023  What changed: how much to take     QUEtiapine 400 MG tablet  Commonly known as: SEROQUEL  What changed: Another medication with the same name was removed. Continue taking this medication, and follow the directions you see here. CONTINUE taking these medications      aspirin 81 MG tablet     atorvastatin 40 MG tablet  Commonly known as: LIPITOR     FLUoxetine 20 MG capsule  Commonly known as: PROZAC     insulin aspart 100 UNIT/ML injection pen  Commonly known as: NovoLOG     insulin glargine 100 UNIT/ML injection vial  Commonly known as: LANTUS     lisinopril 40 MG tablet  Commonly known as: PRINIVIL;ZESTRIL     metFORMIN 1000 MG tablet  Commonly known as: GLUCOPHAGE     True Metrix Blood Glucose Test strip  Generic drug: blood glucose test strips     Trulicity 1.5 FF/5.9IP SC injection  Generic drug: dulaglutide            STOP taking these medications      Levemir FlexTouch 100 UNIT/ML injection pen  Generic drug: insulin detemir               Where to Get Your Medications        [facility], [facility] [address]  [address] [facility] [phone] - F [phone]  [address] [facility] [address]      Phone: [phone]   amLODIPine 10 MG tablet          Activity: activity as tolerated  Diet: diabetic diet  Wound Care: none needed    Time Spent on discharge is more than 30 minutes    Signed:  Jesse Lombardi MD,  MD, PGY-  3/10/2023

## 2023-03-10 NOTE — DISCHARGE INSTRUCTIONS
-Please follow up with your Urologist  -Please get your lab done, renal function panel, within one week and follow up with your PCP. Taylor  Take main lobby elevators to 3rd floor, exit to right and walk around to the infusion center.   Appointment times as follows: starting tomorrow 3/11/23, Saturday and Sunday at [address] [place] [address], Monday-Friday at 2pm

## 2023-03-10 NOTE — DISCHARGE INSTR - COC
Continuity of Care Form    Patient Name: Aubrey Montoya   :  1980  MRN:  [id number]    Admit date:  3/7/2023  Discharge date:  ***    Code Status Order: DNR-CCA   Advance Directives:     Admitting Physician:  Cara Ingram MD  PCP: Tony López MD    Discharging Nurse: [facility] Unit/Room#: 2638/0827-48  Discharging Unit Phone Number: ***    Emergency Contact:   Extended Emergency Contact Information  Primary Emergency Contact: [address] Phone: [phone]  Relation: Parent  Secondary Emergency Contact: [address] Phone: [phone]  Relation: Other    Past Surgical History:  History reviewed. No pertinent surgical history. Immunization History:   Immunization History   Administered Date(s) Administered    Pneumococcal Polysaccharide (Ukcbfsydl19) 2019    Tdap (Boostrix, Adacel) 2018       Active Problems:  Patient Active Problem List   Diagnosis Code    Sepsis ([facility] [address] A41.9    Cellulitis and abscess of upper extremity L03.119, L02.419    Uncontrolled hypertension I10    Uncontrolled type 2 diabetes mellitus with hyperglycemia ([facility] [address] E11.65    History of infection with vancomycin resistant Enterococcus (VRE) Z86.19    Hyperlipidemia E78.5    Splenomegaly, not elsewhere classified R16.1    Retroperitoneal lymphadenopathy R59.0    Emphysematous cystitis N30.80    UTI (urinary tract infection) N39.0       Isolation/Infection:   Isolation            Contact          Patient Infection Status       Infection Onset Added Last Indicated Last Indicated By Review Planned Expiration Resolved Resolved By    ESBL (Extended Spectrum Beta Lactamase) 03/07/23 03/10/23 03/07/23 Culture, Urine        MDRO (multi-drug resistant organism) 11/22/20 03/10/23 03/10/23 Nilda Chester RN        Added from external infection.             Nurse Assessment:  Last Vital Signs: /86   Pulse 70   Temp 98 °F (36.7 °C) (Oral)   Resp 16   Ht 5' 11\" (1.803 m)   Wt 208 lb (94.3 kg)   SpO2 96%   BMI 29.01 kg/m²     Last documented pain score (0-10 scale): Pain Level: 6  Last Weight:   Wt Readings from Last 1 Encounters:   03/10/23 208 lb (94.3 kg)     Mental Status:  {IP PT MENTAL STATUS:}    IV Access:  { ANA IV ACCESS:265536501}    Nursing Mobility/ADLs:  Walking   {CHP DME AVPV:978493514}  Transfer  {P DME LWAU:180273416}  Bathing  {CHP DME SAUR:957956899}  Dressing  {CHP DME KUJY:243449261}  Toileting  {CHP DME BXII:289736223}  Feeding  {P DME RCHP:345133079}  Med Admin  {CHP DME TTWL:045046380}  Med Delivery   { ANA MED Delivery:236650932}    Wound Care Documentation and Therapy:        Elimination:  Continence: Bowel: {YES / AC:67490}  Bladder: {YES / NL:13172}  Urinary Catheter: {Urinary Catheter:081979724}   Colostomy/Ileostomy/Ileal Conduit: {YES / QA:75346}       Date of Last BM: ***    Intake/Output Summary (Last 24 hours) at 3/10/2023 1043  Last data filed at 3/10/2023 0833  Gross per 24 hour   Intake 1560 ml   Output 2390 ml   Net -830 ml     I/O last 3 completed shifts:   In: 1800 [P.O.:1800]  Out: 1 [Urine:2410]    Safety Concerns:     [address] [employer] Safety Concerns:905931828}    Impairments/Disabilities:      [address] Impairments/Disabilities:644881388}    Nutrition Therapy:  Current Nutrition Therapy:   [address] [employer] Diet List:153272242}    Routes of Feeding: {P DME Other Feedings:248323925}  Liquids: {Slp liquid thickness:00623}  Daily Fluid Restriction: {CHP DME Yes amt example:985872620}  Last Modified Barium Swallow with Video (Video Swallowing Test): {Done Not Done ZXOC:820702843}    Treatments at the Time of Hospital Discharge:   Respiratory Treatments: ***  Oxygen Therapy:  {Therapy; copd oxygen:70089}  Ventilator:    { CC Vent LUFA:031221448}    Rehab Therapies: {THERAPEUTIC INTERVENTION:3151318357}  Weight Bearing Status/Restrictions: [address] Weight Bearin}  Other Medical Equipment (for information only, NOT a DME order): {EQUIPMENT:336373528}  Other Treatments: ***    Patient's personal belongings (please select all that are sent with patient):  {CHP DME Belongings:341625085}    RN SIGNATURE:  {Esignature:837114270}    CASE MANAGEMENT/SOCIAL WORK SECTION    Inpatient Status Date: ***    Readmission Risk Assessment Score:  Readmission Risk              Risk of Unplanned Readmission:  12           Discharging to Facility/ Agency   Name:   Address:  Phone:  Fax:    Dialysis Facility (if applicable)   Name:  Address:  Dialysis Schedule:  Phone:  Fax:    / signature: {Esignature:126803473}    PHYSICIAN SECTION    Prognosis: Fair    Condition at Discharge: Stable    Rehab Potential (if transferring to Rehab): Good    Recommended Labs or Other Treatments After Discharge:   INFUSION ORDERS:  - Drug: ertapenem iv 1g qd   - Planned End date: 3/17/2023  - Diagnosis: ESBL E coli UTI, pyelo  - Has received test dose in hospital  - for daily PIV insertion at OP infusion center  - Check CBC w diff, BMP every Mon or [address] result to [phone]  - Call with antibiotic / infusion issues, [phone]  - Call with any change in status, transfer in or out of a facility or to hospital - [phone]  - No f/u in outpatient ID office necessary    Physician Certification: I certify the above information and transfer of Sangeeta Crook  is necessary for the continuing treatment of the diagnosis listed and that he requires Giovanny Aldrich for less 30 days.      Update Admission H&P: No change in H&P    PHYSICIAN SIGNATURE:  Electronically signed by Benito López MD on 3/10/23 at 12:16 PM EST

## 2023-03-11 ENCOUNTER — HOSPITAL ENCOUNTER (OUTPATIENT)
Dept: ONCOLOGY | Age: 43
Setting detail: INFUSION SERIES
Discharge: HOME | End: 2023-03-11
Payer: MEDICARE

## 2023-03-11 VITALS
SYSTOLIC BLOOD PRESSURE: 135 MMHG | TEMPERATURE: 98.4 F | RESPIRATION RATE: 18 BRPM | DIASTOLIC BLOOD PRESSURE: 92 MMHG | OXYGEN SATURATION: 93 % | HEART RATE: 95 BPM

## 2023-03-11 DIAGNOSIS — L03.90 CELLULITIS, UNSPECIFIED CELLULITIS SITE: Primary | ICD-10-CM

## 2023-03-11 PROCEDURE — 96365 THER/PROPH/DIAG IV INF INIT: CPT

## 2023-03-11 PROCEDURE — 6360000002 HC RX W HCPCS: Performed by: INTERNAL MEDICINE

## 2023-03-11 PROCEDURE — 2580000003 HC RX 258: Performed by: INTERNAL MEDICINE

## 2023-03-11 RX ORDER — ACETAMINOPHEN 325 MG/1
650 TABLET ORAL
OUTPATIENT
Start: 2023-03-12

## 2023-03-11 RX ORDER — DIPHENHYDRAMINE HYDROCHLORIDE 50 MG/ML
50 INJECTION INTRAMUSCULAR; INTRAVENOUS
OUTPATIENT
Start: 2023-03-12

## 2023-03-11 RX ORDER — SODIUM CHLORIDE 9 MG/ML
INJECTION, SOLUTION INTRAVENOUS CONTINUOUS
OUTPATIENT
Start: 2023-03-12

## 2023-03-11 RX ORDER — ONDANSETRON 2 MG/ML
8 INJECTION INTRAMUSCULAR; INTRAVENOUS
OUTPATIENT
Start: 2023-03-12

## 2023-03-11 RX ORDER — ALBUTEROL SULFATE 90 UG/1
4 AEROSOL, METERED RESPIRATORY (INHALATION) PRN
OUTPATIENT
Start: 2023-03-12

## 2023-03-11 RX ADMIN — ERTAPENEM SODIUM 1000 MG: 1 INJECTION, POWDER, LYOPHILIZED, FOR SOLUTION INTRAMUSCULAR; INTRAVENOUS at 08:13

## 2023-03-11 NOTE — PROGRESS NOTES
Pt seen and assessed at [address] [facility] for Invanz infusion. Infused per [facility] policy. Monitoring completed for infusion reactions - see flowsheet. Pt tolerated infusion well and without incident. Pt verbalizes understanding of discharge instructions. Discharged ambulatory to home per self. PIV removed prior to D/C.     No Uribe RN

## 2023-03-12 ENCOUNTER — HOSPITAL ENCOUNTER (OUTPATIENT)
Dept: ONCOLOGY | Age: 43
Setting detail: INFUSION SERIES
Discharge: HOME | End: 2023-03-12
Payer: MEDICARE

## 2023-03-12 VITALS
RESPIRATION RATE: 18 BRPM | OXYGEN SATURATION: 93 % | TEMPERATURE: 97.9 F | SYSTOLIC BLOOD PRESSURE: 133 MMHG | DIASTOLIC BLOOD PRESSURE: 88 MMHG | HEART RATE: 93 BPM

## 2023-03-12 DIAGNOSIS — L03.90 CELLULITIS, UNSPECIFIED CELLULITIS SITE: Primary | ICD-10-CM

## 2023-03-12 LAB
BLOOD CULTURE, ROUTINE: NORMAL
BLOOD CULTURE, ROUTINE: NORMAL
CULTURE, BLOOD 2: NORMAL

## 2023-03-12 PROCEDURE — 6360000002 HC RX W HCPCS: Performed by: INTERNAL MEDICINE

## 2023-03-12 PROCEDURE — 96365 THER/PROPH/DIAG IV INF INIT: CPT

## 2023-03-12 PROCEDURE — 2580000003 HC RX 258: Performed by: INTERNAL MEDICINE

## 2023-03-12 RX ORDER — ALBUTEROL SULFATE 90 UG/1
4 AEROSOL, METERED RESPIRATORY (INHALATION) PRN
Status: CANCELLED | OUTPATIENT
Start: 2023-03-13

## 2023-03-12 RX ORDER — ACETAMINOPHEN 325 MG/1
650 TABLET ORAL
Status: CANCELLED | OUTPATIENT
Start: 2023-03-13

## 2023-03-12 RX ORDER — DIPHENHYDRAMINE HYDROCHLORIDE 50 MG/ML
50 INJECTION INTRAMUSCULAR; INTRAVENOUS
Status: CANCELLED | OUTPATIENT
Start: 2023-03-13

## 2023-03-12 RX ORDER — SODIUM CHLORIDE 9 MG/ML
INJECTION, SOLUTION INTRAVENOUS CONTINUOUS
Status: CANCELLED | OUTPATIENT
Start: 2023-03-13

## 2023-03-12 RX ORDER — ONDANSETRON 2 MG/ML
8 INJECTION INTRAMUSCULAR; INTRAVENOUS
Status: CANCELLED | OUTPATIENT
Start: 2023-03-13

## 2023-03-12 RX ADMIN — ERTAPENEM SODIUM 1000 MG: 1 INJECTION, POWDER, LYOPHILIZED, FOR SOLUTION INTRAMUSCULAR; INTRAVENOUS at 08:17

## 2023-03-12 NOTE — PROGRESS NOTES
Pt seen and assessed at [address] [facility] for Invanz infusion. Infused per [facility] policy. Monitoring completed for infusion reactions - see flowsheet. Pt tolerated infusion well and without incident. Pt verbalizes understanding of discharge instructions. Discharged ambulatory to home per self. PIV removed prior to D/C.     Juan Antonio Chambers RN

## 2023-03-12 NOTE — PROGRESS NOTES
Pt seen and assessed at [address] [facility] today for Invanz infusion. Infused per [facility] policy. Monitoring completed for infusion reactions - see flowsheet. Pt tolerated infusion well and without incident. Pt verbalizes understanding of discharge instructions. Discharged ambulatory to home with self.

## 2023-03-13 ENCOUNTER — HOSPITAL ENCOUNTER (OUTPATIENT)
Dept: ONCOLOGY | Age: 43
Setting detail: INFUSION SERIES
Discharge: HOME | End: 2023-03-13
Payer: MEDICARE

## 2023-03-13 VITALS
HEART RATE: 88 BPM | DIASTOLIC BLOOD PRESSURE: 93 MMHG | RESPIRATION RATE: 16 BRPM | OXYGEN SATURATION: 93 % | SYSTOLIC BLOOD PRESSURE: 135 MMHG | TEMPERATURE: 98.3 F

## 2023-03-13 DIAGNOSIS — L03.90 CELLULITIS, UNSPECIFIED CELLULITIS SITE: Primary | ICD-10-CM

## 2023-03-13 DIAGNOSIS — N30.80 EMPHYSEMATOUS CYSTITIS: Primary | ICD-10-CM

## 2023-03-13 PROCEDURE — 6360000002 HC RX W HCPCS: Performed by: INTERNAL MEDICINE

## 2023-03-13 PROCEDURE — 96365 THER/PROPH/DIAG IV INF INIT: CPT

## 2023-03-13 PROCEDURE — 2580000003 HC RX 258: Performed by: INTERNAL MEDICINE

## 2023-03-13 RX ORDER — ACETAMINOPHEN 325 MG/1
650 TABLET ORAL
Status: CANCELLED | OUTPATIENT
Start: 2023-03-14

## 2023-03-13 RX ORDER — ONDANSETRON 2 MG/ML
8 INJECTION INTRAMUSCULAR; INTRAVENOUS
Status: CANCELLED | OUTPATIENT
Start: 2023-03-14

## 2023-03-13 RX ORDER — SODIUM CHLORIDE 9 MG/ML
INJECTION, SOLUTION INTRAVENOUS CONTINUOUS
Status: CANCELLED | OUTPATIENT
Start: 2023-03-14

## 2023-03-13 RX ORDER — ALBUTEROL SULFATE 90 UG/1
4 AEROSOL, METERED RESPIRATORY (INHALATION) PRN
Status: CANCELLED | OUTPATIENT
Start: 2023-03-14

## 2023-03-13 RX ORDER — DIPHENHYDRAMINE HYDROCHLORIDE 50 MG/ML
50 INJECTION INTRAMUSCULAR; INTRAVENOUS
Status: CANCELLED | OUTPATIENT
Start: 2023-03-14

## 2023-03-13 RX ADMIN — ERTAPENEM SODIUM 1000 MG: 1 INJECTION, POWDER, LYOPHILIZED, FOR SOLUTION INTRAMUSCULAR; INTRAVENOUS at 08:38

## 2023-03-13 NOTE — PROGRESS NOTES
Pt seen and assessed at [address] [facility] for Invanz infusion. Infused per [facility] policy. Monitoring completed for infusion reactions - see flowsheet. Pt tolerated infusion well and without incident. Pt verbalizes understanding of discharge instructions. Discharged ambulatory to home per self. PIV removed prior to D/C.     Don Mtz RN

## 2023-03-14 ENCOUNTER — HOSPITAL ENCOUNTER (OUTPATIENT)
Dept: ONCOLOGY | Age: 43
Setting detail: INFUSION SERIES
Discharge: HOME | End: 2023-03-14
Payer: MEDICARE

## 2023-03-14 VITALS
HEART RATE: 84 BPM | DIASTOLIC BLOOD PRESSURE: 91 MMHG | SYSTOLIC BLOOD PRESSURE: 134 MMHG | RESPIRATION RATE: 16 BRPM | TEMPERATURE: 98.1 F | OXYGEN SATURATION: 95 %

## 2023-03-14 DIAGNOSIS — L03.90 CELLULITIS, UNSPECIFIED CELLULITIS SITE: Primary | ICD-10-CM

## 2023-03-14 LAB
ANION GAP SERPL CALCULATED.3IONS-SCNC: 10 MMOL/L (ref 3–16)
BASOPHILS # BLD: 0.1 K/UL (ref 0–0.2)
BASOPHILS NFR BLD: 1.4 %
BUN SERPL-MCNC: 10 MG/DL (ref 7–20)
CALCIUM SERPL-MCNC: 8.8 MG/DL (ref 8.3–10.6)
CHLORIDE SERPL-SCNC: 96 MMOL/L (ref 99–110)
CO2 SERPL-SCNC: 28 MMOL/L (ref 21–32)
CREAT SERPL-MCNC: 1.1 MG/DL (ref 0.9–1.3)
DEPRECATED RDW RBC AUTO: 14.4 % (ref 12.4–15.4)
EOSINOPHIL # BLD: 0.2 K/UL (ref 0–0.6)
EOSINOPHIL NFR BLD: 2 %
GFR SERPLBLD CREATININE-BSD FMLA CKD-EPI: >60 ML/MIN/{1.73_M2}
GLUCOSE SERPL-MCNC: 189 MG/DL (ref 70–99)
HCT VFR BLD AUTO: 49.2 % (ref 40.5–52.5)
HGB BLD-MCNC: 17.3 G/DL (ref 13.5–17.5)
LYMPHOCYTES # BLD: 2.8 K/UL (ref 1–5.1)
LYMPHOCYTES NFR BLD: 27.8 %
MCH RBC QN AUTO: 31 PG (ref 26–34)
MCHC RBC AUTO-ENTMCNC: 35.2 G/DL (ref 31–36)
MCV RBC AUTO: 88 FL (ref 80–100)
MONOCYTES # BLD: 0.7 K/UL (ref 0–1.3)
MONOCYTES NFR BLD: 7.2 %
NEUTROPHILS # BLD: 6.2 K/UL (ref 1.7–7.7)
NEUTROPHILS NFR BLD: 61.6 %
PLATELET # BLD AUTO: 214 K/UL (ref 135–450)
PMV BLD AUTO: 9.2 FL (ref 5–10.5)
POTASSIUM SERPL-SCNC: 3.7 MMOL/L (ref 3.5–5.1)
RBC # BLD AUTO: 5.59 M/UL (ref 4.2–5.9)
SODIUM SERPL-SCNC: 134 MMOL/L (ref 136–145)
WBC # BLD AUTO: 10 K/UL (ref 4–11)

## 2023-03-14 PROCEDURE — 6360000002 HC RX W HCPCS: Performed by: INTERNAL MEDICINE

## 2023-03-14 PROCEDURE — 96365 THER/PROPH/DIAG IV INF INIT: CPT

## 2023-03-14 PROCEDURE — 80048 BASIC METABOLIC PNL TOTAL CA: CPT

## 2023-03-14 PROCEDURE — 2580000003 HC RX 258: Performed by: INTERNAL MEDICINE

## 2023-03-14 PROCEDURE — 85025 COMPLETE CBC W/AUTO DIFF WBC: CPT

## 2023-03-14 RX ORDER — ONDANSETRON 2 MG/ML
8 INJECTION INTRAMUSCULAR; INTRAVENOUS
Status: CANCELLED | OUTPATIENT
Start: 2023-03-15

## 2023-03-14 RX ORDER — DIPHENHYDRAMINE HYDROCHLORIDE 50 MG/ML
50 INJECTION INTRAMUSCULAR; INTRAVENOUS
Status: CANCELLED | OUTPATIENT
Start: 2023-03-15

## 2023-03-14 RX ORDER — ALBUTEROL SULFATE 90 UG/1
4 AEROSOL, METERED RESPIRATORY (INHALATION) PRN
Status: CANCELLED | OUTPATIENT
Start: 2023-03-15

## 2023-03-14 RX ORDER — SODIUM CHLORIDE 9 MG/ML
INJECTION, SOLUTION INTRAVENOUS CONTINUOUS
Status: CANCELLED | OUTPATIENT
Start: 2023-03-15

## 2023-03-14 RX ORDER — ACETAMINOPHEN 325 MG/1
650 TABLET ORAL
Status: CANCELLED | OUTPATIENT
Start: 2023-03-15

## 2023-03-14 RX ADMIN — ERTAPENEM SODIUM 1000 MG: 1 INJECTION, POWDER, LYOPHILIZED, FOR SOLUTION INTRAMUSCULAR; INTRAVENOUS at 08:27

## 2023-03-14 NOTE — PROGRESS NOTES
Pt seen and assessed at [address] [facility] for Invanz infusion. Infused per [facility] policy. Monitoring completed for infusion reactions - see flowsheet. Pt tolerated infusion well and without incident. Pt verbalizes understanding of discharge instructions. Discharged ambulatory to home per self. PIV removed prior to D/C.     Beau Sandhoff, RN

## 2023-03-15 ENCOUNTER — HOSPITAL ENCOUNTER (OUTPATIENT)
Dept: ONCOLOGY | Age: 43
Setting detail: INFUSION SERIES
Discharge: HOME | End: 2023-03-15
Payer: MEDICARE

## 2023-03-15 VITALS
RESPIRATION RATE: 16 BRPM | TEMPERATURE: 98.5 F | HEART RATE: 88 BPM | DIASTOLIC BLOOD PRESSURE: 95 MMHG | OXYGEN SATURATION: 95 % | SYSTOLIC BLOOD PRESSURE: 133 MMHG

## 2023-03-15 DIAGNOSIS — L03.90 CELLULITIS, UNSPECIFIED CELLULITIS SITE: Primary | ICD-10-CM

## 2023-03-15 PROCEDURE — 96365 THER/PROPH/DIAG IV INF INIT: CPT

## 2023-03-15 PROCEDURE — 2580000003 HC RX 258: Performed by: INTERNAL MEDICINE

## 2023-03-15 PROCEDURE — 6360000002 HC RX W HCPCS: Performed by: INTERNAL MEDICINE

## 2023-03-15 RX ORDER — ACETAMINOPHEN 325 MG/1
650 TABLET ORAL
Status: CANCELLED | OUTPATIENT
Start: 2023-03-16

## 2023-03-15 RX ORDER — ALBUTEROL SULFATE 90 UG/1
4 AEROSOL, METERED RESPIRATORY (INHALATION) PRN
Status: CANCELLED | OUTPATIENT
Start: 2023-03-16

## 2023-03-15 RX ORDER — SODIUM CHLORIDE 9 MG/ML
INJECTION, SOLUTION INTRAVENOUS CONTINUOUS
Status: CANCELLED | OUTPATIENT
Start: 2023-03-16

## 2023-03-15 RX ORDER — DIPHENHYDRAMINE HYDROCHLORIDE 50 MG/ML
50 INJECTION INTRAMUSCULAR; INTRAVENOUS
Status: CANCELLED | OUTPATIENT
Start: 2023-03-16

## 2023-03-15 RX ORDER — ONDANSETRON 2 MG/ML
8 INJECTION INTRAMUSCULAR; INTRAVENOUS
Status: CANCELLED | OUTPATIENT
Start: 2023-03-16

## 2023-03-15 RX ADMIN — ERTAPENEM SODIUM 1000 MG: 1 INJECTION, POWDER, LYOPHILIZED, FOR SOLUTION INTRAMUSCULAR; INTRAVENOUS at 07:29

## 2023-03-15 NOTE — PROGRESS NOTES
Physician Progress Note      Grace Wilkins  [facility] #:                  [id number]  :                       1980  ADMIT DATE:       3/7/2023 9:44 PM  [address] DATE:        3/10/2023 6:25 PM  RESPONDING  PROVIDER #:        Antolin SMITH          QUERY TEXT:    Patient admitted with cystitis. Noted documentation of sepsis in PN 3/8. In   order to support the diagnosis of sepsis, please include additional clinical   indicators in your documentation. Or please document if the diagnosis of   sepsis has been ruled out after further study    The medical record reflects the following:  Risk Factors: 43 yo w/ cycstitis, DM, hx of VRE  Clinical Indicators: Per PN 3/8: Pxt was tachycardic on presentation but no   other SIRS features. Sepsis not POA. WBC 9.7 - 8.5. Temps 98 - 98.8. Per   ED: Negative for chills, fatigue and fever. Treatment: Blood cultures, lactic acid, IVF bolus, IV Merrem. Options provided:  -- Sepsis due to cystitis present as evidenced by, Please document evidence. -- Sepsis was ruled out after study  -- Other - I will add my own diagnosis  -- Disagree - Not applicable / Not valid  -- Disagree - Clinically unable to determine / Unknown  -- Refer to Clinical Documentation Reviewer    PROVIDER RESPONSE TEXT:    Sepsis was ruled out after study.     Query created by: Chaz Taylor on 3/15/2023 9:00 AM      Electronically signed by:  Lorine Closs NGWU 3/15/2023 2:56 PM

## 2023-03-15 NOTE — PROGRESS NOTES
Pt seen and assessed at [address] [facility] for Invanz infusion. Infused per [facility] policy. Monitoring completed for infusion reactions - see flowsheet. Pt tolerated infusion well and without incident. Pt verbalizes understanding of discharge instructions. Discharged ambulatory to home per self. PIV removed prior to D/C.     Jeff Tripathi RN

## 2023-03-16 ENCOUNTER — TELEPHONE (OUTPATIENT)
Dept: INFECTIOUS DISEASES | Age: 43
End: 2023-03-16

## 2023-03-16 ENCOUNTER — HOSPITAL ENCOUNTER (OUTPATIENT)
Dept: ONCOLOGY | Age: 43
Setting detail: INFUSION SERIES
Discharge: HOME | End: 2023-03-16
Payer: MEDICARE

## 2023-03-16 VITALS
HEART RATE: 78 BPM | DIASTOLIC BLOOD PRESSURE: 82 MMHG | RESPIRATION RATE: 16 BRPM | SYSTOLIC BLOOD PRESSURE: 138 MMHG | TEMPERATURE: 98.5 F | OXYGEN SATURATION: 98 %

## 2023-03-16 DIAGNOSIS — L03.90 CELLULITIS, UNSPECIFIED CELLULITIS SITE: Primary | ICD-10-CM

## 2023-03-16 PROCEDURE — 2580000003 HC RX 258: Performed by: INTERNAL MEDICINE

## 2023-03-16 PROCEDURE — 96365 THER/PROPH/DIAG IV INF INIT: CPT

## 2023-03-16 PROCEDURE — 6360000002 HC RX W HCPCS: Performed by: INTERNAL MEDICINE

## 2023-03-16 RX ORDER — DIPHENHYDRAMINE HYDROCHLORIDE 50 MG/ML
50 INJECTION INTRAMUSCULAR; INTRAVENOUS
OUTPATIENT
Start: 2023-03-17

## 2023-03-16 RX ORDER — SODIUM CHLORIDE 9 MG/ML
INJECTION, SOLUTION INTRAVENOUS CONTINUOUS
OUTPATIENT
Start: 2023-03-17

## 2023-03-16 RX ORDER — ACETAMINOPHEN 325 MG/1
650 TABLET ORAL
OUTPATIENT
Start: 2023-03-17

## 2023-03-16 RX ORDER — ONDANSETRON 2 MG/ML
8 INJECTION INTRAMUSCULAR; INTRAVENOUS
OUTPATIENT
Start: 2023-03-17

## 2023-03-16 RX ORDER — ALBUTEROL SULFATE 90 UG/1
4 AEROSOL, METERED RESPIRATORY (INHALATION) PRN
OUTPATIENT
Start: 2023-03-17

## 2023-03-16 RX ADMIN — ERTAPENEM SODIUM 1000 MG: 1 INJECTION, POWDER, LYOPHILIZED, FOR SOLUTION INTRAMUSCULAR; INTRAVENOUS at 10:30

## 2023-03-16 NOTE — PROGRESS NOTES
Pt seen and assessed at [facility] today for Invanz infusion.  Infused per [facility] policy.  Monitoring completed for infusion reactions - see flowsheet.  Pt tolerated infusion well and without incident.  Pt verbalizes understanding of discharge instructions.  Discharged ambulatory to home per self.     PIV removed prior to D/C.  Cira Bowden RN

## 2023-03-16 NOTE — TELEPHONE ENCOUNTER
Spoke with pt, pt states he feels fine, no complaints. Spoke with Cira RN at [facility] verbalized understanding to DC IV ABX  after last dose on 3/17/22.

## 2023-03-17 ENCOUNTER — HOSPITAL ENCOUNTER (OUTPATIENT)
Dept: ONCOLOGY | Age: 43
Setting detail: INFUSION SERIES
Discharge: HOME | End: 2023-03-17
Payer: MEDICARE

## 2023-03-17 VITALS
DIASTOLIC BLOOD PRESSURE: 98 MMHG | OXYGEN SATURATION: 94 % | RESPIRATION RATE: 17 BRPM | HEART RATE: 95 BPM | SYSTOLIC BLOOD PRESSURE: 143 MMHG | TEMPERATURE: 99 F

## 2023-03-17 DIAGNOSIS — L03.90 CELLULITIS, UNSPECIFIED CELLULITIS SITE: Primary | ICD-10-CM

## 2023-03-17 PROCEDURE — 2580000003 HC RX 258: Performed by: INTERNAL MEDICINE

## 2023-03-17 PROCEDURE — 96365 THER/PROPH/DIAG IV INF INIT: CPT

## 2023-03-17 PROCEDURE — 6360000002 HC RX W HCPCS: Performed by: INTERNAL MEDICINE

## 2023-03-17 RX ORDER — ALBUTEROL SULFATE 90 UG/1
4 AEROSOL, METERED RESPIRATORY (INHALATION) PRN
OUTPATIENT
Start: 2023-03-18

## 2023-03-17 RX ORDER — ONDANSETRON 2 MG/ML
8 INJECTION INTRAMUSCULAR; INTRAVENOUS
OUTPATIENT
Start: 2023-03-18

## 2023-03-17 RX ORDER — SODIUM CHLORIDE 9 MG/ML
INJECTION, SOLUTION INTRAVENOUS CONTINUOUS
OUTPATIENT
Start: 2023-03-18

## 2023-03-17 RX ORDER — DIPHENHYDRAMINE HYDROCHLORIDE 50 MG/ML
50 INJECTION INTRAMUSCULAR; INTRAVENOUS
OUTPATIENT
Start: 2023-03-18

## 2023-03-17 RX ORDER — ACETAMINOPHEN 325 MG/1
650 TABLET ORAL
OUTPATIENT
Start: 2023-03-18

## 2023-03-17 RX ADMIN — ERTAPENEM SODIUM 1000 MG: 1 INJECTION, POWDER, LYOPHILIZED, FOR SOLUTION INTRAMUSCULAR; INTRAVENOUS at 09:06

## 2023-03-17 NOTE — PROGRESS NOTES
Pt seen and assessed at [address] [facility] for Invanz infusion. Infused per [facility] policy. Monitoring completed for infusion reactions - see flowsheet. Pt tolerated infusion well and without incident. Pt verbalizes understanding of discharge instructions. Discharged ambulatory to home per self. PIV removed prior to D/C.   Aditi Cowan RN

## 2024-11-08 NOTE — OP NOTE
Operative note    Facility: [facility].    Date: 3/6/2020    Preoperative diagnosis: Left Antecubital abscess    Postoperative diagnosis: same    Procedure: Incision and drainage Left antecubital elbow abscess including skin, fascia and subfascial tissue    Surgeon: Anniece Apgar MD    Asst.: none    Anesthesia: Local    Indications: 45yo male with what appears consistent with injection site abscess, localized, erythematous, indurated. In need of localized pressure relief for efficacy of antibiotic therapy and reduce infectious burden. Informed consent was discussed with the patient. This included a detailed description of the procedure. Risks, benefits and alternatives specific to this diagnosis and procedure were outlined. Standard surgical risks of anesthesia, bleeding, nerve damage, infection, need for further surgeries, disability and death also outlined. Verbal confirmation of informed consent was obtained. Signature obtained by the staff. Procedural description: Patient was correctly identified and consent confirmed. Local anesthesia achieved with 12cc 1% Lidocaine injected peripherally. The area was prepped and draped in sterile fashion. Gown, gloves and sterile instruments. Skin prepared with betadine. We subsequentlyincised 6cm longitudinally over point of maximum induration. Copious purulent material expressed. We sharply debrided the necrotic skin edges. We bluntly opened the deep fascia over the flexor pronator group and manually expressed more necrotic material.  At this point we irrigated 1 liter of betadine mixed saline dilutely. Finally, I sharply excised any deep tissue which appeared non viable. The wound was packed with iodoform gauze and a sterile bulky wrap applied. Complications: none noted    Implants: none    EBL;  25mL    Postoperative plan: Antibiotic therapy, elevation. Hand ROM. Discharge with PO ABX once convinced the clinical exam improving.   Await aspecific culture results           [address] [facility]     This dictation was performed with a verbal recognition program (DRAGON) and it was checked for errors. It is possible that there are still dictated errors within this office note. If so, please bring any errors to my attention for an addendum. All efforts were made to ensure that this office note is accurate. none